# Patient Record
Sex: MALE | Race: WHITE | Employment: FULL TIME | URBAN - METROPOLITAN AREA
[De-identification: names, ages, dates, MRNs, and addresses within clinical notes are randomized per-mention and may not be internally consistent; named-entity substitution may affect disease eponyms.]

---

## 2017-01-26 ENCOUNTER — HOSPITAL ENCOUNTER (OUTPATIENT)
Dept: MRI IMAGING | Age: 37
Discharge: HOME | End: 2017-01-26
Attending: ORTHOPAEDIC SURGERY
Payer: COMMERCIAL

## 2017-01-26 DIAGNOSIS — S46.819A: ICD-10-CM

## 2017-01-26 PROCEDURE — 73221 MRI JOINT UPR EXTREM W/O DYE: CPT

## 2018-04-16 ENCOUNTER — OFFICE VISIT (OUTPATIENT)
Dept: SURGERY | Age: 38
End: 2018-04-16

## 2018-04-16 VITALS
HEIGHT: 72 IN | TEMPERATURE: 98.7 F | HEART RATE: 87 BPM | BODY MASS INDEX: 38.47 KG/M2 | RESPIRATION RATE: 18 BRPM | SYSTOLIC BLOOD PRESSURE: 138 MMHG | OXYGEN SATURATION: 97 % | DIASTOLIC BLOOD PRESSURE: 84 MMHG | WEIGHT: 284 LBS

## 2018-04-16 DIAGNOSIS — I10 ESSENTIAL HYPERTENSION: ICD-10-CM

## 2018-04-16 DIAGNOSIS — K21.9 GASTROESOPHAGEAL REFLUX DISEASE, ESOPHAGITIS PRESENCE NOT SPECIFIED: ICD-10-CM

## 2018-04-16 DIAGNOSIS — E66.01 SEVERE OBESITY (BMI 35.0-39.9): Primary | ICD-10-CM

## 2018-04-16 DIAGNOSIS — G47.33 OSA (OBSTRUCTIVE SLEEP APNEA): ICD-10-CM

## 2018-04-16 RX ORDER — IBUPROFEN 200 MG
800 TABLET ORAL
COMMUNITY
End: 2019-05-17

## 2018-04-16 NOTE — PROGRESS NOTES
1. Have you been to the ER, urgent care clinic since your last visit? Hospitalized since your last visit?  no    2. Have you seen or consulted any other health care providers outside of the [address] [place] [address] since your last visit? Include any pap smears or colon screening. No          Reina Fuelling  Body composition    male  42 y.o. Vitals:    04/16/18 1108   Height: 6' (1.829 m)     There is no height or weight on file to calculate BMI.   .Neck- 19.5 inches  Waist- 51.5 inches  Hips- 51 inches  Frame size-  medium

## 2018-04-16 NOTE — PROGRESS NOTES
HISTORY OF PRESENT ILLNESS  Claudeen Rocks is a 42 y.o. male. HPI   Chief Complaint   Patient presents with    New Patient     new bariatric patient     Claudeen Rocks is a 42 y.o. male that presents today for evaluation for weight loss surgery. Obesity \"getting worse and affecting my job\"; worse as teen after hip surgery. Weight now affecting job and if \"I don't get a handle on it I will lose my job\". Mom had gastric bypass many years ago.   He completed an on line seminar and would like to be considered for Sleeve gastrectomy for treatment of morbid obesity     Medi Weight loss - did well and lost about 25 lbs   Atkins, Weight watchers, low carb plans   Diet:  Skips breakfast, eats lunch and dinner; snacks in the evening (salty crunchy snacks); + sweets; hungry often  Drinks mostly Dr. Niyah Sharp or [facility], trying to do more water   Patient Active Problem List   Diagnosis Code    Severe obesity (BMI 35.0-39.9) ([facility] [address] [address] E66.01     Past Medical History:   Diagnosis Date    Arthritis     Depression     Hypertension     borderline/no lnger on any medications    Migraine     Morbid obesity ([facility] [address]     DIETER (obstructive sleep apnea)     mild     Other ill-defined conditions(799.89)     Kidney stones     Past Surgical History:   Procedure Laterality Date    HX APPENDECTOMY      HX CARPAL TUNNEL RELEASE Right     HX HEENT      Tonsilectomy    HX ORTHOPAEDIC      right hip surgery x4      Social History     Social History    Marital status:      Spouse name: N/A    Number of children: 3    Years of education: N/A     Occupational History    maintenance       Social History Main Topics    Smoking status: Former Smoker     Packs/day: 0.25     Quit date: 08/2013    Smokeless tobacco: Never Used      Comment: vaps     Alcohol use No    Drug use: No    Sexual activity: Yes     Partners: Female     Other Topics Concern    Not on file     Social History Narrative    In the home with spouse and 15year old (2 older children out of the house)      Family History   Problem Relation Age of Onset    Dementia Mother     Heart Disease Father     Dementia Father     Diabetes Father        Current Outpatient Prescriptions:     ibuprofen (MOTRIN) 200 mg tablet, Take 800 mg by mouth every eight (8) hours as needed for Pain., Disp: , Rfl:     butalbital-acetaminophen-caffeine (FIORICET, ESGIC) -40 mg per tablet, Take 2 Tabs by mouth every six (6) hours as needed for Headache. Max Daily Amount: 6 Tabs., Disp: 30 Tab, Rfl: 0    prochlorperazine (COMPAZINE) 5 mg tablet, 1-2 tabs po every 6 hours prn nausea or headache, Disp: 40 Tab, Rfl: 0  Allergies   Allergen Reactions    Hydrocodone Anxiety    Oxycodone Anxiety     PCP Zainab Pereira MD    Review of Systems   Constitutional: Positive for malaise/fatigue (\"zapped all day\"). HENT: Positive for sore throat (awakens with sore throat ). Eyes: Negative. Respiratory: Positive for shortness of breath. Negative for cough and wheezing (in the past ). Snores terrible and has had + sleep eval in the past but not enough apnea to qualify for CPAP \"30-40 lbs ago\"  Per spouse \"snores awful\"   Cardiovascular: Positive for chest pain (\"think it is heartburn\"). Negative for leg swelling. Gastrointestinal: Positive for diarrhea (post prandial and always loose ) and heartburn (weekly , no Rx ). Negative for abdominal pain, blood in stool, constipation, melena, nausea and vomiting. Genitourinary: Negative for dysuria, frequency, hematuria and urgency. Hx kidney stones   Last bout 2017; has had ureteral stent in the past    Musculoskeletal: Positive for back pain (low back ), joint pain (knee pain and swelling , right hip ) and myalgias. Negative for falls and neck pain. Skin:        Scaling on scalp and face    Neurological: Positive for headaches (migraines few times per year ). Negative for tingling and seizures.         Restless leg Endo/Heme/Allergies: Negative. Psychiatric/Behavioral: The patient does not have insomnia. Physical Exam   Constitutional: He is oriented to person, place, and time. No distress. /84  Pulse 87  Temp 98.7 °F (37.1 °C)  Resp 18  Ht 6' (1.829 m)  Wt 284 lb (128.8 kg)  SpO2 97%  BMI 38.52 kg/m2     Cardiovascular: Normal rate and regular rhythm. Pulmonary/Chest: Effort normal and breath sounds normal.   Abdominal: Soft. Obese     Musculoskeletal:   Ambulating independently    Neurological: He is alert and oriented to person, place, and time. Skin: He is not diaphoretic. Psychiatric: He has a normal mood and affect. ASSESSMENT and PLAN    ICD-10-CM ICD-9-CM    1. Severe obesity (BMI 35.0-39.9) (HCC) E66.01 278.01    2. DIETER (obstructive sleep apnea) G47.33 327.23    3. Essential hypertension I10 401.9    4. BMI 38.0-38.9,adult Z68.38 V85.38      David Hickey meets criteria established by the NIH. Without weight reduction, co-morbidities will escalate as well as risk of early mortality. Recommendation is patient could be served with surgical weight reduction, the procedure of Sleeve gastrectomy for treatment of morbid obesity . I explained to the patient differences between laparoscopic gastric bypass and sleeve gastrectomy procedures. Patient has viewed our informational meeting and has seen our educational materials. Patient desires to have surgery with Francisco Javier Jules MD.   Sleeve gastrectomy or vertical gastric resection involves a resection of the vast majority of the stomach usually done with a dilator pressed against the right half of the stomach of the lesser curvature. One preserves the pyloric sphincter at the lower end of the stomach and then sequentially staples and divides all the way up to the gastroesophageal junction leaving a small rim of the stomach to the left better known as the angle of His.  This procedure significantly reduces the amount that the stomach can hold while removing the part of the stomach that secretes the hormone grehlin and alters the speed of food emptying from the stomach. Once food leaves the stomach, the remainder of the intestinal tract is completely intact and there is no effect on the digestion or absorption of food nutrients and calories. The procedure is intermediate between the adjustable gastric band and the gastric bypass as far as weight loss, potential complications and resolution of comorbid diseases such as Type 2 diabetes, high blood pressure, sleep apnea, arthritic pain, cholesterol disorders to mention a few. The usual complications are that of any procedure which affects the ability of the stomach to accept food at any given time. Those are usually nausea and vomiting which either spontaneously resolve or require endoscopic dilatation. The most serious complication from this surgery is a leak from the staple line usually near the  gastroesophageal junction. The frequency of this serious complication is low and usually heals spontaneously although reoperation may be necessary. The other serious complications are those which can occur with any major surgery and include  Infection, bleeding, blood clots and/or cardiopulmonary problems. 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 I reviewed risks and complications associated with each procedure. I discussed a diet high in protein, low-fat, low- sugar, limited carbohydrates, and discontinuing use of carbonated beverages. Also discussed physical activity and exercises. I have answered all questions, they wish to proceed.   Judge Corrigan verbalized understanding and questions were answered to the best of my knowledge and ability. Bariatric surgery  educational materials were provided. 55 minutes spent in face to face with David Hickey > 50% counseling.     ** Copy to PCP Harman Macias MD

## 2018-04-24 PROBLEM — E66.01 SEVERE OBESITY (BMI 35.0-39.9): Status: ACTIVE | Noted: 2018-04-24

## 2018-06-19 ENCOUNTER — HOSPITAL ENCOUNTER (OUTPATIENT)
Dept: GENERAL RADIOLOGY | Age: 38
Discharge: HOME | End: 2018-06-19
Attending: NURSE PRACTITIONER
Payer: COMMERCIAL

## 2018-06-19 DIAGNOSIS — E66.01 SEVERE OBESITY (BMI 35.0-39.9): ICD-10-CM

## 2018-06-19 PROCEDURE — 74241 XR UPPER GI W KUB/ BA SWALLOW: CPT

## 2018-06-25 ENCOUNTER — CLINICAL SUPPORT (OUTPATIENT)
Dept: SURGERY | Age: 38
End: 2018-06-25

## 2018-06-25 DIAGNOSIS — E66.9 OBESITY (BMI 30-39.9): Primary | ICD-10-CM

## 2018-06-26 VITALS — WEIGHT: 289 LBS | BODY MASS INDEX: 39.2 KG/M2

## 2018-06-26 NOTE — PROGRESS NOTES
Pre-operative Bariatric Nutrition Evaluation    Date: 2018   Clover Rico M.D. Name: Alona Mullen  :  1980  Age:  42  Gender: Male   Type of Surgery: []           Gastric Bypass  []           LAGB  [x]           Sleeve Gastrectomy    ASSESSMENT:    Past Medical History:HTN, symptoms of sleep apnea, high cholesterol, arthritis, depression, anxiety      Medications/Supplements:   Prior to Admission medications    Medication Sig Start Date End Date Taking? Authorizing Provider   ibuprofen (MOTRIN) 200 mg tablet Take 800 mg by mouth every eight (8) hours as needed for Pain. Historical Provider   butalbital-acetaminophen-caffeine (FIORICET, ESGIC) -40 mg per tablet Take 2 Tabs by mouth every six (6) hours as needed for Headache. Max Daily Amount: 6 Tabs. 9/5/15   Chris Moreno MD   prochlorperazine (COMPAZINE) 5 mg tablet 1-2 tabs po every 6 hours prn nausea or headache 9/5/15   Chris Moreno MD       Food Allergies/Intolerances:none    Anthropometrics:    Ht:6'    Wt: 289#    IBW: 178#    %IBW: 162%     BMI:39    Category: obesity II     Reported wt history:Pt presents today for pre-op nutrition evaluation for wt loss surgery. Pt reports being \"skinny as a kid\" and wt gain began around age 10 after having 3 hip surgeries which resulted in decreased physical activity. Pt also reports a strong family h/o obesity. Lowest adult BW was 230# at age 25 and current wt is highest adult BW. Pt has been able to lose up to 25# through various diets but has been unable to achieve more long term or significant wt loss. Pt is now seeking approval for weight loss surgery.       Exercise/Physical Activity:recently started walking daily for 1 hour     Reported Diet History:Medi Weight Loss - lost 25# and \"was easy to follow\"'; Atkins, Weight Watchers, diet pills,     24 Hour Diet Recall  Breakfast  Cup of coffee; usually skips breakfast    Lunch  [place] ([facility]) sandwich, chips, nuts, cookie    Dinner  Steak, potato, veggies/salad    Snacks  Pretzels, popcorn, ice cream    Beverages  Water, sugar free tea      A pre-op nutrition checklist was reviewed. Patient checked off 5 of 15 items. Environment/Psychosocial/Support:pt's wife, children and in-laws are listed as main support system. Pt rates support level a 7 out of 10. Pt reports his wife is disabled and they have 3 children. Pt does majority of grocery shopping and cooking. Pt works full time. NUTRITION DIAGNOSIS:  1. Self-monitoring deficit r/t previous lack of value for this change evidenced by pt skips meals. 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 Also reports tendency to eat more when stressed and some emotional eating on occasion. 2. Food and nutrition related knowledge deficit r/t previous lack of exposure to information evidenced by pt seeking nutrition education in preparation for sleeve gastrectomy. NUTRITION INTERVENTION:  Pt educated on nutrition recommendations for weight loss surgery, specifically sleeve gastrectomy. Instructed on consuming 3 meals per day starting now. Use the balanced plate method to plan meals, include 3 oz of lean source of protein, 1/2 cup whole grains, unlimited non-starchy vegetables, 1/2 cup fruit and 1 serving of low fat dairy. Utilize handouts listing healthy snack and meal ideas to limit restaurant meals. After surgery measure all meals to 1/2 cup. Each meal will contain a 1/4 cup lean protein and 1/4 cup fruit, non-starchy vegetable or starch (limiting to once per day). Aim for 60 g protein per day. Sip on 48-64 oz of sugar free, calorie free, non-carbonated beverages each day. Do not use a straw. Do not consume beverages 30 minutes before, during or 30 minutes after meals. Read all nutrition labels. Demonstrated and emphasized identifying serving size, total fat, sugar and protein content.  Defined low fat as </= 3 g per serving. Discussed lean and extra lean sources of protein. Provided list of low fat cooking methods. Avoid foods with sugar listed in the first 3 ingredients and >/15 g sugar per serving. Excess sugar/fat intake may lead to dumping syndrome. Discussed signs and symptoms of dumping syndrome. Practice mindful eating habits; take small bites, chew thoroughly, avoid distractions, utilize hunger/fullness scale. Consume meals over 20-30 minutes. Attend Bariatric Support Group and increase physical activity (approved per MD) for long term weight maintenance. NUTRITION MONITORING AND EVALUATION:    The following goals were established with patient;  1. Work on eating 3 meals a day. Okay to use a protein shake as a meal. Do not skip meals after surgery to ensure adequate protein intake. 2. Monitor stress/emotional eating and find non-food coping strategies. 3. Maintain current fluid intake and beverage choices. 4. Use balanced plate method for help with meal planning and portion control before surgery. 5. Maintain current exercise/walking regimen. 6. Follow up with RD in 2 weeks to review general nutrition guidelines for sleeve gastrectomy. Specific tips and techniques to facilitate compliance with above recommendations were provided and discussed. Pt was strongly encourage to begin making necessary changes now, attend support group, and re-visit the dietitian prn. Nutrition evaluation reveals some small lifestyle/behavior changes are indicated. Goals set and recommendations made. Will follow up in 2 weeks to re-evaluate compliance with goals and further education. If further details are desired please feel free to contact me at [phone]. This phone number was also provided to the patient for any further questions or concerns.            Gil Shetty RD

## 2018-06-28 ENCOUNTER — HOSPITAL ENCOUNTER (OUTPATIENT)
Dept: SLEEP MEDICINE | Age: 38
Discharge: HOME | End: 2018-06-28
Payer: COMMERCIAL

## 2018-06-28 ENCOUNTER — OFFICE VISIT (OUTPATIENT)
Dept: SLEEP MEDICINE | Age: 38
End: 2018-06-28

## 2018-06-28 VITALS
HEIGHT: 72 IN | DIASTOLIC BLOOD PRESSURE: 78 MMHG | BODY MASS INDEX: 39.01 KG/M2 | HEART RATE: 94 BPM | WEIGHT: 288 LBS | OXYGEN SATURATION: 95 % | SYSTOLIC BLOOD PRESSURE: 113 MMHG

## 2018-06-28 DIAGNOSIS — G25.81 RESTLESS LEG SYNDROME: ICD-10-CM

## 2018-06-28 DIAGNOSIS — G47.33 OSA (OBSTRUCTIVE SLEEP APNEA): Primary | ICD-10-CM

## 2018-06-28 PROCEDURE — 95806 SLEEP STUDY UNATT&RESP EFFT: CPT | Performed by: SPECIALIST

## 2018-06-28 NOTE — PROGRESS NOTES
[address] [facility] [place], [address]  Tel.  [phone]  Fax. [address] [facility] [address]  [place], [address] [facility]  Tel.  [phone]  Fax. [phone] [address]  Tel.  [phone]  Fax. [phone]       Chief Complaint       Chief Complaint   Patient presents with    Sleep Problem     NP; ref Dr Davide Matt; for DIETER       HPI      Dave Jhaveri is a 42y.o. year old male referred by Dr. Davide Matt for evaluation of a sleep disorder  . He reports a many year history of difficulty with snoring described as loud. His wife has told him of associated apnea. He will awaken still feeling fatigued. He states that he is tired during the day more notably when in active such as reading or watching television. He continues to have some leg movements. He denies sleep paralysis, hypnagogic hallucinations, cataplexy. He has not had any appreciable change in weight. He states that he had an evaluation over 10 years ago potentially at [facility]. He was told that he did not have significant sleep disordered breathing but was noted to have frequent nocturnal leg movements. He was started on treatment which he discontinued due to frequent arm jerks; the leg movements did respond. The patient retires at 10 pm and awakens at 6:30 am. The patient notes that he will not experience frequent awakening from sleep. In general he is able to return to sleep after awakening. he tends to awaken spontaneously. [place] Sleepiness Score: 10       Allergies   Allergen Reactions    Hydrocodone Anxiety    Oxycodone Anxiety       Current Outpatient Prescriptions   Medication Sig Dispense Refill    ibuprofen (MOTRIN) 200 mg tablet Take 800 mg by mouth every eight (8) hours as needed for Pain.       butalbital-acetaminophen-caffeine (FIORICET, ESGIC) -40 mg per tablet Take 2 Tabs by mouth every six (6) hours as needed for Headache. Max Daily Amount: 6 Tabs. 30 Tab 0    prochlorperazine (COMPAZINE) 5 mg tablet 1-2 tabs po every 6 hours prn nausea or headache 40 Tab 0        He  has a past medical history of Arthritis; Depression; Hypertension; Migraine; Morbid obesity ([address] DIETER (obstructive sleep apnea); and Other ill-defined conditions(799.89). He  has a past surgical history that includes hx appendectomy; hx orthopaedic; hx heent; and hx carpal tunnel release (Right). He family history includes Dementia in his father and mother; Diabetes in his father; Heart Disease in his father. He  reports that he quit smoking about 4 years ago. He smoked 0.25 packs per day. He has never used smokeless tobacco. He reports that he does not drink alcohol or use illicit drugs. Review of Systems:  Review of Systems   HENT: Negative for hearing loss. Eyes: Negative for double vision. Respiratory: Negative for cough and shortness of breath. Cardiovascular: Negative for chest pain. Gastrointestinal: Negative for heartburn. Genitourinary: Negative for urgency. Musculoskeletal: Positive for back pain and joint pain. Skin: Negative for rash. Neurological: Negative for tremors and weakness. Endo/Heme/Allergies: Does not bruise/bleed easily. Psychiatric/Behavioral: Positive for depression. Objective:     Visit Vitals    /78    Pulse 94    Ht 6' (1.829 m)    Wt 288 lb (130.6 kg)    SpO2 95%    BMI 39.06 kg/m2     Body mass index is 39.06 kg/(m^2). General:   Conversant, cooperative   Eyes:  Pupils equal and reactive, no nystagmus, flat disk margins. Normal A/V ratio   Oropharynx:   Mallampati score III, tongue large,   Tonsils:   tonsils normal   Neck:   No carotid bruits; Neck circ.  in \"inches\": 19.25   Chest/Lungs:  Clear on auscultation    CVS:  Normal rate, regular rhythm, trace dorsalis pedis, no distal edema   Skin:  Warm to touch; no obvious rashes   Neuro:  Speech fluent, face symmetrical, tongue movement normal   Psych:  Normal affect,  normal countenance        Assessment:       ICD-10-CM ICD-9-CM    1. DIETER (obstructive sleep apnea) G47.33 327.23 SLEEP STUDY UNATTENDED, 4 CHANNEL   2. Restless leg syndrome G25.81 333.94      History of significant snoring, witnessed apnea, nonrestorative sleep and daytime fatigue consistent with sleep disordered breathing. He will be evaluated with a home sleep test.  He has been previously diagnosed with Restless leg syndrome; at present that appears to be less of a problem. Plan:     Orders Placed This Encounter    SLEEP STUDY UNATTENDED, 4 CHANNEL     Order Specific Question:   Reason for Exam     Answer:   Snoring, witnessed apnea, nonrestorative sleep, daytime fatigue       * Patient has a history and examination consistent with the diagnosis of sleep apnea. * Sleep testing was ordered for initial evaluation. * He was provided information on sleep apnea including corresponding risk factors and the importance of proper treatment. * Treatment options if indicated were reviewed today. Potential benefit of weight reduction was discussed. Weight reduction techniques reviewed.       Leslie Herrera MD, Ariadna Bolden  06/28/18

## 2018-06-28 NOTE — PATIENT INSTRUCTIONS
Sleep Apnea: Care Instructions  Your Care Instructions    Sleep apnea means that you frequently stop breathing for 10 seconds or longer during sleep. It can be mild to severe, based on the number of times an hour that you stop breathing or have slowed breathing. Blocked or narrowed airways in your nose, mouth, or throat can cause sleep apnea. Your airway can become blocked when your throat muscles and tongue relax during sleep. You can treat sleep apnea at home by making lifestyle changes. You also can use a CPAP breathing machine that keeps tissues in the throat from blocking your airway. Or your doctor may suggest that you use a breathing device while you sleep. It helps keep your airway open. This could be a device that you put in your mouth. Other examples include strips or disks that you use on your nose. In some cases, surgery may be needed to remove enlarged tissues in the throat. Follow-up care is a key part of your treatment and safety. Be sure to make and go to all appointments, and call your doctor if you are having problems. It's also a good idea to know your test results and keep a list of the medicines you take. How can you care for yourself at home? · Lose weight, if needed. It may reduce the number of times you stop breathing or have slowed breathing. · Sleep on your side. It may stop mild apnea. If you tend to roll onto your back, sew a pocket in the back of your pajama top. Put a tennis ball into the pocket, and stitch the pocket shut. This will help keep you from sleeping on your back. · Avoid alcohol and medicines such as sleeping pills and sedatives before bed. · Do not smoke. Smoking can make sleep apnea worse. If you need help quitting, talk to your doctor about stop-smoking programs and medicines. These can increase your chances of quitting for good. · Prop up the head of your bed 4 to 6 inches by putting bricks under the legs of the bed.   · Treat breathing problems, such as a stuffy nose, caused by a cold or allergies. · Try a continuous positive airway pressure (CPAP) breathing machine if your doctor recommends it. The machine keeps your airway open when you sleep. · If CPAP does not work for you, ask your doctor if you can try other breathing machines. A bilevel positive airway pressure machine uses one type of air pressure for breathing in and another type for breathing out. Another device raises or lowers air pressure as needed while you breathe. · Talk to your doctor if:  ¨ Your nose feels dry or bleeds when you use one of these machines. You may need to increase moisture in the air. A humidifier may help. ¨ Your nose is runny or stuffy from using a breathing machine. Decongestants or a corticosteroid nasal spray may help. ¨ You are sleepy during the day and it gets in the way of the normal things you do. Do not drive when you are drowsy. When should you call for help? Watch closely for changes in your health, and be sure to contact your doctor if:  ? · You still have sleep apnea even though you have made lifestyle changes. ? · You are thinking of trying a device such as CPAP. ? · You are having problems using a CPAP or similar machine. Where can you learn more? Go to http://ramon-damari.info/. Enter I758 in the search box to learn more about \"Sleep Apnea: Care Instructions. \"  Current as of: May 12, 2017  Content Version: 11.4  © 8283-9152 [employer]. Care instructions adapted under license by [employer] (which disclaims liability or warranty for this information). If you have questions about a medical condition or this instruction, always ask your healthcare professional. [address] any warranty or liability for your use of this information.

## 2018-06-29 ENCOUNTER — DOCUMENTATION ONLY (OUTPATIENT)
Dept: SLEEP MEDICINE | Age: 38
End: 2018-06-29

## 2018-07-02 ENCOUNTER — TELEPHONE (OUTPATIENT)
Dept: SLEEP MEDICINE | Age: 38
End: 2018-07-02

## 2018-07-02 NOTE — TELEPHONE ENCOUNTER
HSAT demonstrates mild elevation in AHI when supine. Prominent snoring is noted. The patient does have a history of nonrestorative sleep. He may benefit from an oral appliance. Chief technologist: Advise patient of study results.   Suggest  does referral for an oral appliance

## 2018-07-03 ENCOUNTER — DOCUMENTATION ONLY (OUTPATIENT)
Dept: SLEEP MEDICINE | Age: 38
End: 2018-07-03

## 2018-07-03 NOTE — PROGRESS NOTES
This note is being routed to Sandy Charles NP. Sleep Medicine consult note and sleep study report in patient's chart for review.     Thank you for the referral.

## 2018-07-13 ENCOUNTER — CLINICAL SUPPORT (OUTPATIENT)
Dept: SURGERY | Age: 38
End: 2018-07-13

## 2018-07-13 DIAGNOSIS — E66.9 OBESITY (BMI 30-39.9): Primary | ICD-10-CM

## 2018-07-16 VITALS — BODY MASS INDEX: 39.47 KG/M2 | WEIGHT: 291 LBS

## 2018-07-16 NOTE — PROGRESS NOTES
Pre-operative Bariatric Nutrition Evaluation - Follow Up     Date: 2018  Eduardo Gillespie M.D. Name: Love Hart  :  1980  Age:  42  Gender: Male   Type of Surgery: []           Gastric Bypass  []           LAGB  [x]           Sleeve Gastrectomy    ASSESSMENT:    Pt presents today for pre-op nutrition follow up in preparation for sleeve gastrectomy. Initial nutrition evaluation revealed some lifestyle and behavior changes were indicated to better prepare for surgery. Goals were set and recommendations were made. Today pt reports several changes made in the past 2 weeks including eating 3 meals a day (not skipping), slowing down his eating pace, using protein shakes, limiting grazing, more packing from home and intentional eating and no sodas/juice/Gatorades. He has gotten a promotion at work which allow more structure to his meal times/patterns. Despite changes pt's wt is up 2#. Reports he has recently finished school. Overall pt demonstrates motivation for lifestyle changes and good understanding of general nutrition guidelines for sleeve gastrectomy. Anthropometrics:    Ht:6'  Today's Wt: 291#  Wt at initial nutrition visit (18): 289#   Net change: 2# wt gain       BMI:39    Category: obesity II     Exercise/Physical Activity:maintains walking for 1 hour daily     A pre-op nutrition checklist was reviewed. Patient checked off 10 of 15 items. NUTRITION DIAGNOSIS:  No nutrition diagnosis at this time. NUTRITION INTERVENTION:  Pt educated on nutrition recommendations for weight loss surgery, specifically sleeve gastrectomy. Instructed on consuming 3 meals per day starting now. Use the balanced plate method to plan meals, include 3 oz of lean source of protein, 1/2 cup whole grains, unlimited non-starchy vegetables, 1/2 cup fruit and 1 serving of low fat dairy. Utilize handouts listing healthy snack and meal ideas to limit restaurant meals.       After surgery measure all meals to 1/2 cup. Each meal will contain a 1/4 cup lean protein and 1/4 cup fruit, non-starchy vegetable or starch (limiting to once per day). Aim for 60 g protein per day. Sip on 48-64 oz of sugar free, calorie free, non-carbonated beverages each day. Do not use a straw. Do not consume beverages 30 minutes before, during or 30 minutes after meals. Read all nutrition labels. Demonstrated and emphasized identifying serving size, total fat, sugar and protein content. Defined low fat as </= 3 g per serving. Discussed lean and extra lean sources of protein. Provided list of low fat cooking methods. Avoid foods with sugar listed in the first 3 ingredients and >/15 g sugar per serving. Excess sugar/fat intake may lead to dumping syndrome. Discussed signs and symptoms of dumping syndrome. Practice mindful eating habits; take small bites, chew thoroughly, avoid distractions, utilize hunger/fullness scale. Consume meals over 20-30 minutes. Attend Bariatric Support Group and increase physical activity (approved per MD) for long term weight maintenance. NUTRITION MONITORING AND EVALUATION:    The following goals were established with patient;  1. Maintain lifestyle and behavior changes made. 2. Maintain exercise. 3. Continue to focus on protein at each meal and practice eating protein first at the meal.  4. Maintain adequate fluid intake. 5. Follow up with RD PRN. Specific tips and techniques to facilitate compliance with above recommendations were provided and discussed. Pt was strongly encourage to begin making necessary changes now, attend support group, and re-visit the dietitian prn. Nutrition follow up reveals pt has made appropriate lifestyle and behavior changes and demonstrates good understanding of nutrition guidelines for sleeve gastrectomy. Appears to be an appropriate candidate for surgery at this time.   If further details are desired please feel free to contact me at [phone]. This phone number was also provided to the patient for any further questions or concerns.            Samara Burns RD

## 2018-08-27 ENCOUNTER — OFFICE VISIT (OUTPATIENT)
Dept: SURGERY | Age: 38
End: 2018-08-27

## 2018-08-27 VITALS
OXYGEN SATURATION: 94 % | SYSTOLIC BLOOD PRESSURE: 108 MMHG | TEMPERATURE: 98.5 F | HEART RATE: 82 BPM | WEIGHT: 284.8 LBS | RESPIRATION RATE: 20 BRPM | HEIGHT: 72 IN | BODY MASS INDEX: 38.57 KG/M2 | DIASTOLIC BLOOD PRESSURE: 78 MMHG

## 2018-08-27 DIAGNOSIS — E66.01 SEVERE OBESITY (BMI 35.0-39.9): Primary | ICD-10-CM

## 2018-08-27 DIAGNOSIS — G47.33 OSA (OBSTRUCTIVE SLEEP APNEA): ICD-10-CM

## 2018-08-27 DIAGNOSIS — I10 ESSENTIAL HYPERTENSION: ICD-10-CM

## 2018-08-27 NOTE — MR AVS SNAPSHOT
[address] [id number] [phone] Patient: Vandana Ontiveros MRN: [id number] QRK:[id number] Visit Information Date & Time Provider Department Dept. Phone Encounter #  
 8/27/2018  2:40 PM Ivan Eastman MD [address] [address] [phone] [id number] Upcoming Health Maintenance Date Due DTaP/Tdap/Td series (1 - Tdap) 7/25/1999 Influenza Age 5 to Adult 8/1/2018 Allergies as of 8/27/2018  Review Complete On: 8/27/2018 By: Ivan Eastman MD  
  
 Severity Noted Reaction Type Reactions Hydrocodone  06/25/2014    Anxiety Oxycodone  05/11/2013   Intolerance Anxiety Current Immunizations  Never Reviewed No immunizations on file. Not reviewed this visit You Were Diagnosed With   
  
 Codes Comments Severe obesity (BMI 35.0-39.9) (HCC)    -  Primary ICD-10-CM: E66.01 
ICD-9-CM: 278.01   
 DIETER (obstructive sleep apnea)     ICD-10-CM: G47.33 
ICD-9-CM: 327.23 Essential hypertension     ICD-10-CM: I10 
ICD-9-CM: 401.9 Vitals BP Pulse Temp Resp Height(growth percentile) Weight(growth percentile) 108/78 (BP 1 Location: Left arm, BP Patient Position: Sitting) 82 98.5 °F (36.9 °C) (Oral) 20 6' (1.829 m) 284 lb 12.8 oz (129.2 kg) SpO2 BMI Smoking Status 94% 38.63 kg/m2 Former Smoker Vitals History BMI and BSA Data Body Mass Index Body Surface Area  
 38.63 kg/m 2 2.56 m 2 Preferred Pharmacy Pharmacy Name Phone [id number] [phone] Your Updated Medication List  
  
   
This list is accurate as of 8/27/18  5:16 PM.  Always use your most recent med list.  
  
  
  
  
 butalbital-acetaminophen-caffeine -40 mg per tablet Commonly known as:  [place] Take 2 Tabs by mouth every six (6) hours as needed for Headache. Max Daily Amount: 6 Tabs. ibuprofen 200 mg tablet Commonly known as:  MOTRIN Take 800 mg by mouth every eight (8) hours as needed for Pain.  
  
 prochlorperazine 5 mg tablet Commonly known as:  COMPAZINE  
1-2 tabs po every 6 hours prn nausea or headache Patient Instructions Learning About Bariatric Surgery What is bariatric surgery? Bariatric surgery is surgery to help you lose weight. This type of surgery is only used for people who are very overweight and have not been able to lose weight with diet and exercise. This surgery makes the stomach smaller. Some types of surgery also change the connection between your stomach and intestines. How is bariatric surgery done? Bariatric surgery may be either \"open\" or \"laparoscopic. \" Open surgery is done through a large cut (incision) in the belly. Laparoscopic surgery is done through several small cuts. The doctor puts a lighted tube, or scope, and other surgical tools through small cuts in your belly. The doctor is able to see your organs with the scope. There are different types of bariatric surgery. Gastric sleeve surgery The surgery is usually done through several small incisions in the belly. The doctor removes more than half of your stomach. This leaves a thin sleeve, or tube, that is about the size of a banana. Because part of your stomach has been removed, this can't be reversed. Jessy-en-Y gastric bypass surgery Jessy-en-Y (say \"mukul-en-why\") surgery changes the connection between the stomach and the intestines. The doctor separates a section of your stomach from the rest of your stomach. This makes a small pouch. The new pouch will hold the food you eat. The doctor connects the stomach pouch to the middle part of the small intestine. Gastric banding surgery The surgery is usually done through several small incisions in the belly. The doctor wraps a band around the upper part of the stomach.  This creates a small pouch. The small size of the pouch means that you will get full after you eat just a small amount of food. The doctor can inflate or deflate the band to adjust the size. This lets the doctor adjust how quickly food passes from the new pouch into the stomach. It does not change the connection between the stomach and the intestines. What can you expect after the surgery? You may stay in the hospital for one or more days after the surgery. How long you stay depends on the type of surgery you had. Most people need 2 to 4 weeks before they are ready to get back to their usual routine. For the first 2 to 6 weeks after surgery, you probably will need to follow a liquid or soft diet. Bit by bit, you will be able to eat more solid foods. Your doctor may advise you to work with a dietitian. This way you'll be sure to get enough protein, vitamins, and minerals while you are losing weight. Even with a healthy diet, you may need to take vitamin and mineral supplements. After surgery, you will not be able to eat very much at one time. You will get full quickly. Try not to eat too much at one time or eat foods that are high in fat or sugar. If you do, you may vomit, get stomach pain, or have diarrhea. You probably will lose weight very quickly in the first few months after surgery. As time goes on, your weight loss will slow down. You will have regular doctor visits to check how you are doing. Think of bariatric surgery as a tool to help you lose weight. It isn't an instant fix. You will still need to eat a healthy diet and get regular exercise. This will help you reach your weight goal and avoid regaining the weight you lose. Follow-up care is a key part of your treatment and safety. Be sure to make and go to all appointments, and call your doctor if you are having problems. It's also a good idea to know your test results and keep a list of the medicines you take. Where can you learn more? Go to http://ramon-damari.info/. Enter G469 in the search box to learn more about \"Learning About Bariatric Surgery. \" Current as of: October 9, 2017 Content Version: 11.7 © 3486-1867 [employer]. Care instructions adapted under license by [employer] (which disclaims liability or warranty for this information). If you have questions about a medical condition or this instruction, always ask your healthcare professional. [address] any warranty or liability for your use of this information. Introducing [facility]! Jeana Welsh introduces Semanticator patient portal. Now you can access parts of your medical record, email your doctor's office, and request medication refills online. 1. In your internet browser, go to [email or web address] 2. Click on the First Time User? Click Here link in the Sign In box. You will see the New Member Sign Up page. 3. Enter your Semanticator Access Code exactly as it appears below. You will not need to use this code after youve completed the sign-up process. If you do not sign up before the expiration date, you must request a new code. · Semanticator Access Code: [email or web address] Expires: 10/26/2018  5:19 AM 
 
4. Enter the last four digits of your Social Security Number (xxxx) and Date of Birth (mm/dd/yyyy) as indicated and click Submit. You will be taken to the next sign-up page. 5. Create a Semanticator ID. This will be your Semanticator login ID and cannot be changed, so think of one that is secure and easy to remember. 6. Create a Semanticator password. You can change your password at any time. 7. Enter your Password Reset Question and Answer. This can be used at a later time if you forget your password. 8. Enter your e-mail address. You will receive e-mail notification when new information is available in [address] 9. Click Sign Up. You can now view and download portions of your medical record. 10. Click the Download Summary menu link to download a portable copy of your medical information. If you have questions, please visit the Frequently Asked Questions section of the [facility] website. Remember, [facility] is NOT to be used for urgent needs. For medical emergencies, dial 911. Now available from your iPhone and Android! Please provide this summary of care documentation to your next provider. Your primary care clinician is listed as Adrian العلي. If you have any questions after today's visit, please call [phone].

## 2018-08-27 NOTE — PROGRESS NOTES
1. Have you been to the ER, urgent care clinic since your last visit? Hospitalized since your last visit? No    2. Have you seen or consulted any other health care providers outside of the [address] [place] [address] since your last visit? Include any pap smears or colon screening.  No

## 2018-08-27 NOTE — PROGRESS NOTES
Bariatric Surgery Consult    Magui Gunderson is a 34 y.o. male with a history of morbid obesity. His Height: 6' (182.9 cm), Weight: 284 lb 12.8 oz (129.2 kg). Body mass index is 38.63 kg/(m^2). He reports that he has been trying to lose weight for 10 years. His maximum weight was 284 pounds. He has attended our bariatric surgery information seminar. Mikaela Durand wants to consider laparoscopic sleeve gastrectomy. Pt is referred by:  Katelin Alba MD.      Comorbidities:     Bariatric comorbidities present: hypertension and obstructive sleep apnea    Ambulatory status: independent    The patient's reported level of exercise: moderately active. Patient Active Problem List    Diagnosis Date Noted    Severe obesity (BMI 35.0-39.9) ([address] 04/24/2018     Past Medical History:   Diagnosis Date    Arthritis     Depression     Hypertension     borderline/no lnger on any medications    Migraine     Morbid obesity ([address]     DIETER (obstructive sleep apnea)     mild     Other ill-defined conditions(799.89)     Kidney stones      Past Surgical History:   Procedure Laterality Date    HX APPENDECTOMY      HX CARPAL TUNNEL RELEASE Right     HX HEENT      Tonsilectomy    HX ORTHOPAEDIC      right hip surgery x4       Social History   Substance Use Topics    Smoking status: Former Smoker     Packs/day: 0.25     Quit date: 08/2013    Smokeless tobacco: Never Used      Comment: vaps     Alcohol use No      Family History   Problem Relation Age of Onset    Dementia Mother     Heart Disease Father     Dementia Father     Diabetes Father       . Current Outpatient Prescriptions   Medication Sig    butalbital-acetaminophen-caffeine (FIORICET, ESGIC) -40 mg per tablet Take 2 Tabs by mouth every six (6) hours as needed for Headache. Max Daily Amount: 6 Tabs.  ibuprofen (MOTRIN) 200 mg tablet Take 800 mg by mouth every eight (8) hours as needed for Pain.     prochlorperazine (COMPAZINE) 5 mg tablet 1-2 tabs po every 6 hours prn nausea or headache     No current facility-administered medications for this visit. Allergies   Allergen Reactions    Hydrocodone Anxiety    Oxycodone Anxiety         Review of Systems:    Constitutional: negative  Ears, Nose, Mouth, Throat, and Face: negative  Respiratory: negative  Cardiovascular: negative  Gastrointestinal: negative  Genitourinary:negative  Integument/Breast: negative  Hematologic/Lymphatic: negative  Musculoskeletal:negative  Neurological: negative  Behavioral/Psychiatric: negative    Objective:     Visit Vitals    /78 (BP 1 Location: Left arm, BP Patient Position: Sitting)    Pulse 82    Temp 98.5 °F (36.9 °C) (Oral)    Resp 20    Ht 6' (1.829 m)    Wt 284 lb 12.8 oz (129.2 kg)    SpO2 94%    BMI 38.63 kg/m2        Physical Exam:    General:  alert, no distress, morbidly obese   Eyes:  conjunctivae and sclerae normal, pupils equal, round, reactive to light, extraocular movements intact without nystagmus   Throat & Neck: no erythema or exudates noted and neck supple and symmetrical; no palpable masses   Lungs:   clear to auscultation bilaterally   Heart:  Regular rate and rhythm   Abdomen:   obese, soft, nontender, nondistended, no masses or organomegaly,    Extremities: no edema,  no gait disturbances   Skin: Normal.       Assessment:     1. Morbid obesity (Body mass index is 38.63 kg/(m^2). ) with multiple comorbidities. The patient meets criteria established by the NIH for weight loss surgery candidates. Without weight reduction, co-morbidities will escalate as well as increase risk of early mortality. Our recommendation is the patient could be served with laparoscopic sleeve gastrectomy. I explained to the patient differences between laparoscopic gastric bypass, laparoscopic adjustable gastric banding, and laparoscopic vertical sleeve gastrectomy with respect to expected weight loss, resolution of comorbidities and risks.  Mr. Josie Ayala has attended one our informational meetings and has seen our educational materials. He has requested Dr. Twan Conrad to perform his procedure. I reviewed the role for this procedure as a tool to help him achieve his weight loss goals. I reminded him that effective weight loss comes from lifelong adherence to changes in dietary choices, eating habits and exercise. Recommendation: We will request approval for laparoscopic sleeve gastrectomy. He is a good candidate for sleeve gastrectomy. He has minimal to no GERD. Signed By: Sarah Grissom MD     August 27, 2018       Greater than half of the time: 30 minutes was used in counciling the patient about bariatric surgery and the steps she needs to take to move forward with her surgery. Mr. Mayela Arguelles has a reminder for a \"due or due soon\" health maintenance. I have asked that he contact his primary care provider for follow-up on this health maintenance.

## 2018-08-27 NOTE — LETTER
8/27/2018 5:15 PM 
 
Patient:  Love Hart YOB: 1980 Date of Visit: 8/27/2018 Dear Ronald Lujan MD 
[address] [place] [address] VIA Facsimile: [phone] 
 : Thank you for referring Mr. Luke Carr to me for evaluation/treatment. Below are the relevant portions of my assessment and plan of care. If you have questions, please do not hesitate to call me. I look forward to following Mr. Lj Schulz along with you. Sincerely, Dean Kim MD

## 2018-08-27 NOTE — PATIENT INSTRUCTIONS
Learning About Bariatric Surgery  What is bariatric surgery? Bariatric surgery is surgery to help you lose weight. This type of surgery is only used for people who are very overweight and have not been able to lose weight with diet and exercise. This surgery makes the stomach smaller. Some types of surgery also change the connection between your stomach and intestines. How is bariatric surgery done? Bariatric surgery may be either \"open\" or \"laparoscopic. \" Open surgery is done through a large cut (incision) in the belly. Laparoscopic surgery is done through several small cuts. The doctor puts a lighted tube, or scope, and other surgical tools through small cuts in your belly. The doctor is able to see your organs with the scope. There are different types of bariatric surgery. Gastric sleeve surgery  The surgery is usually done through several small incisions in the belly. The doctor removes more than half of your stomach. This leaves a thin sleeve, or tube, that is about the size of a banana. Because part of your stomach has been removed, this can't be reversed. Jessy-en-Y gastric bypass surgery  Jessy-en-Y (say \"mukul-en-why\") surgery changes the connection between the stomach and the intestines. The doctor separates a section of your stomach from the rest of your stomach. This makes a small pouch. The new pouch will hold the food you eat. The doctor connects the stomach pouch to the middle part of the small intestine. Gastric banding surgery  The surgery is usually done through several small incisions in the belly. The doctor wraps a band around the upper part of the stomach. This creates a small pouch. The small size of the pouch means that you will get full after you eat just a small amount of food. The doctor can inflate or deflate the band to adjust the size. This lets the doctor adjust how quickly food passes from the new pouch into the stomach.  It does not change the connection between the stomach and the intestines. What can you expect after the surgery? You may stay in the hospital for one or more days after the surgery. How long you stay depends on the type of surgery you had. Most people need 2 to 4 weeks before they are ready to get back to their usual routine. For the first 2 to 6 weeks after surgery, you probably will need to follow a liquid or soft diet. Bit by bit, you will be able to eat more solid foods. Your doctor may advise you to work with a dietitian. This way you'll be sure to get enough protein, vitamins, and minerals while you are losing weight. Even with a healthy diet, you may need to take vitamin and mineral supplements. After surgery, you will not be able to eat very much at one time. You will get full quickly. Try not to eat too much at one time or eat foods that are high in fat or sugar. If you do, you may vomit, get stomach pain, or have diarrhea. You probably will lose weight very quickly in the first few months after surgery. As time goes on, your weight loss will slow down. You will have regular doctor visits to check how you are doing. Think of bariatric surgery as a tool to help you lose weight. It isn't an instant fix. You will still need to eat a healthy diet and get regular exercise. This will help you reach your weight goal and avoid regaining the weight you lose. Follow-up care is a key part of your treatment and safety. Be sure to make and go to all appointments, and call your doctor if you are having problems. It's also a good idea to know your test results and keep a list of the medicines you take. Where can you learn more? Go to http://ramon-damari.info/. Enter G469 in the search box to learn more about \"Learning About Bariatric Surgery. \"  Current as of: October 9, 2017  Content Version: 11.7  © 8336-3258 [employer].  Care instructions adapted under license by [employer] (which disclaims liability or warranty for this information). If you have questions about a medical condition or this instruction, always ask your healthcare professional. [place] [address] any warranty or liability for your use of this information.

## 2019-02-25 ENCOUNTER — OFFICE VISIT (OUTPATIENT)
Dept: SURGERY | Age: 39
End: 2019-02-25

## 2019-02-25 VITALS
DIASTOLIC BLOOD PRESSURE: 83 MMHG | HEART RATE: 93 BPM | RESPIRATION RATE: 20 BRPM | SYSTOLIC BLOOD PRESSURE: 123 MMHG | OXYGEN SATURATION: 95 % | HEIGHT: 72 IN | TEMPERATURE: 98.5 F | WEIGHT: 285 LBS | BODY MASS INDEX: 38.6 KG/M2

## 2019-02-25 DIAGNOSIS — G47.33 OSA (OBSTRUCTIVE SLEEP APNEA): ICD-10-CM

## 2019-02-25 DIAGNOSIS — I10 ESSENTIAL HYPERTENSION: ICD-10-CM

## 2019-02-25 DIAGNOSIS — E66.01 SEVERE OBESITY (BMI 35.0-35.9 WITH COMORBIDITY) (HCC): Primary | ICD-10-CM

## 2019-02-25 NOTE — LETTER
2/25/2019 4:32 PM 
 
Patient:  Elyse Choudhary YOB: 1980 Date of Visit: 2/25/2019 Dear Heydi Rivas MD 
[address] [place] [address] VIA Facsimile: [phone] 
 : Thank you for referring Mr. Calixto Jeter to me for evaluation/treatment. Below are the relevant portions of my assessment and plan of care. If you have questions, please do not hesitate to call me. I look forward to following Mr. Jenniffer Rust along with you. Sincerely, Haylee Gabriel MD

## 2019-02-25 NOTE — PROGRESS NOTES
Bariatric Surgery Consult Lopez White is a 34 y.o. male with a history of morbid obesity. His Height: 6' (182.9 cm), Weight: 285 lb (129.3 kg). Body mass index is 38.65 kg/m². He reports that he has been trying to lose weight for 10 years. His maximum weight was 285 pounds. He has attended our bariatric surgery information seminar. Tatyana Martinez wants to consider laparoscopic sleeve gastrectomy. He had been previously approved for surgery but could go through with surgery due to a family medical emergency. He is now ready to proceed with surgery approval 
 
Pt is referred by:  Ron Sesay MD. Comorbidities:  
 
Bariatric comorbidities present: hypertension and obstructive sleep apnea Ambulatory status: independent The patient's reported level of exercise: moderately active. Patient Active Problem List  
 Diagnosis Date Noted  Severe obesity (BMI 35.0-39.9) 2018 Past Medical History:  
Diagnosis Date  Arthritis  Depression  Hypertension   
 borderline/no lnger on any medications  Migraine  Morbid obesity ([address]  DIETER (obstructive sleep apnea)   
 mild  Other ill-defined conditions(799.89) Kidney stones Past Surgical History:  
Procedure Laterality Date  HX APPENDECTOMY  HX CARPAL TUNNEL RELEASE Right  HX HEENT Tonsilectomy  HX ORTHOPAEDIC    
 right hip surgery x4 Social History Tobacco Use  Smoking status: Former Smoker Packs/day: 0.25 Last attempt to quit: 2013 Years since quittin.5  Smokeless tobacco: Never Used  Tobacco comment: vaps Substance Use Topics  Alcohol use: No  
  
Family History Problem Relation Age of Onset  Dementia Mother  Heart Disease Father  Dementia Father  Diabetes Father Lisa Peaks Current Outpatient Medications Medication Sig  ibuprofen (MOTRIN) 200 mg tablet Take 800 mg by mouth every eight (8) hours as needed for Pain.  butalbital-acetaminophen-caffeine (FIORICET, ESGIC) -40 mg per tablet Take 2 Tabs by mouth every six (6) hours as needed for Headache. Max Daily Amount: 6 Tabs.  prochlorperazine (COMPAZINE) 5 mg tablet 1-2 tabs po every 6 hours prn nausea or headache No current facility-administered medications for this visit. Allergies Allergen Reactions  Hydrocodone Anxiety  Oxycodone Anxiety Review of Systems:   
Constitutional: negative Ears, Nose, Mouth, Throat, and Face: negative Respiratory: negative Cardiovascular: negative Gastrointestinal: negative Genitourinary:negative Integument/Breast: negative Hematologic/Lymphatic: negative Musculoskeletal:negative Neurological: negative Objective:  
 
Visit Vitals /83 (BP 1 Location: Left arm, BP Patient Position: Sitting) Pulse 93 Temp 98.5 °F (36.9 °C) (Oral) Resp 20 Ht 6' (1.829 m) Wt 285 lb (129.3 kg) SpO2 95% BMI 38.65 kg/m² Physical Exam: 
 
General:  alert, no distress, morbidly obese Eyes:  conjunctivae and sclerae normal, pupils equal, round, reactive to light, extraocular movements intact without nystagmus Throat & Neck: no erythema or exudates noted and neck supple and symmetrical; no palpable masses Lungs:   clear to auscultation bilaterally Heart:  Regular rate and rhythm Abdomen:   obese, soft, nontender, nondistended, no masses or organomegaly, Extremities: no edema,  no gait disturbances Skin: Normal.  
 
 
Assessment:  
 
1. Morbid obesity (Body mass index is 38.65 kg/m².) with multiple comorbidities. The patient meets criteria established by the NIH for weight loss surgery candidates. Without weight reduction, co-morbidities will escalate as well as increase risk of early mortality. Our recommendation is the patient could be served with laparoscopic sleeve gastrectomy.  I explained to the patient differences between laparoscopic gastric bypass, laparoscopic adjustable gastric banding, and laparoscopic vertical sleeve gastrectomy with respect to expected weight loss, resolution of comorbidities and risks. Mr. Amalia Solo has attended one our informational meetings and has seen our educational materials. He has requested Dr. Laurel Mercado to perform his procedure. I reviewed the role for this procedure as a tool to help him achieve his weight loss goals. I reminded him that effective weight loss comes from lifelong adherence to changes in dietary choices, eating habits and exercise. Recommendation: We will request approval for laparoscopic sleeve gastrectomy. He is a good candidate for sleeve gastrectomy and will be resubmitted for approval.   
 
Signed By: Boubacar Lr MD   
 February 25, 2019 Greater than half of the time: 30 minutes was used in counciling the patient about bariatric surgery and the steps she needs to take to move forward with her surgery. Mr. Amalia Solo has a reminder for a \"due or due soon\" health maintenance. I have asked that he contact his primary care provider for follow-up on this health maintenance.

## 2019-02-25 NOTE — PROGRESS NOTES
1. Have you been to the ER, urgent care clinic since your last visit? Hospitalized since your last visit? No 
 
2. Have you seen or consulted any other health care providers outside of the [address] [place] [address] since your last visit? Include any pap smears or colon screening.  No

## 2019-04-22 ENCOUNTER — HOSPITAL ENCOUNTER (OUTPATIENT)
Dept: PREADMISSION TESTING | Age: 39
Discharge: HOME | End: 2019-04-22
Payer: COMMERCIAL

## 2019-04-22 ENCOUNTER — OFFICE VISIT (OUTPATIENT)
Dept: SURGERY | Age: 39
End: 2019-04-22

## 2019-04-22 ENCOUNTER — HOSPITAL ENCOUNTER (OUTPATIENT)
Dept: GENERAL RADIOLOGY | Age: 39
Discharge: HOME | End: 2019-04-22
Attending: SURGERY
Payer: COMMERCIAL

## 2019-04-22 VITALS
RESPIRATION RATE: 18 BRPM | OXYGEN SATURATION: 99 % | BODY MASS INDEX: 40.94 KG/M2 | HEART RATE: 72 BPM | DIASTOLIC BLOOD PRESSURE: 82 MMHG | TEMPERATURE: 98.1 F | SYSTOLIC BLOOD PRESSURE: 125 MMHG | HEIGHT: 70 IN | WEIGHT: 286 LBS

## 2019-04-22 VITALS
BODY MASS INDEX: 40.94 KG/M2 | HEIGHT: 70 IN | SYSTOLIC BLOOD PRESSURE: 125 MMHG | WEIGHT: 286 LBS | RESPIRATION RATE: 18 BRPM | DIASTOLIC BLOOD PRESSURE: 82 MMHG | TEMPERATURE: 98.1 F | OXYGEN SATURATION: 99 % | HEART RATE: 72 BPM

## 2019-04-22 VITALS
DIASTOLIC BLOOD PRESSURE: 82 MMHG | WEIGHT: 286 LBS | HEIGHT: 70 IN | TEMPERATURE: 98.1 F | OXYGEN SATURATION: 99 % | HEART RATE: 72 BPM | SYSTOLIC BLOOD PRESSURE: 125 MMHG | BODY MASS INDEX: 40.94 KG/M2

## 2019-04-22 DIAGNOSIS — E66.01 SEVERE OBESITY (BMI 35.0-35.9 WITH COMORBIDITY) (HCC): Primary | ICD-10-CM

## 2019-04-22 DIAGNOSIS — E66.01 MORBID OBESITY WITH BMI OF 40.0-44.9, ADULT (HCC): Primary | ICD-10-CM

## 2019-04-22 DIAGNOSIS — G47.33 OSA (OBSTRUCTIVE SLEEP APNEA): ICD-10-CM

## 2019-04-22 DIAGNOSIS — G43.101 MIGRAINE WITH AURA AND WITH STATUS MIGRAINOSUS, NOT INTRACTABLE: ICD-10-CM

## 2019-04-22 DIAGNOSIS — K21.9 GASTROESOPHAGEAL REFLUX DISEASE, ESOPHAGITIS PRESENCE NOT SPECIFIED: ICD-10-CM

## 2019-04-22 LAB
ALBUMIN SERPL-MCNC: 3.7 G/DL (ref 3.5–5)
ALBUMIN/GLOB SERPL: 1.1 {RATIO} (ref 1.1–2.2)
ALP SERPL-CCNC: 78 U/L (ref 45–117)
ALT SERPL-CCNC: 23 U/L (ref 12–78)
ANION GAP SERPL CALC-SCNC: 4 MMOL/L (ref 5–15)
APPEARANCE UR: CLEAR
AST SERPL-CCNC: 13 U/L (ref 15–37)
ATRIAL RATE: 66 BPM
BACTERIA URNS QL MICRO: NEGATIVE /HPF
BASOPHILS # BLD: 0.1 K/UL (ref 0–0.1)
BASOPHILS NFR BLD: 1 % (ref 0–1)
BILIRUB SERPL-MCNC: 0.5 MG/DL (ref 0.2–1)
BILIRUB UR QL: NEGATIVE
BUN SERPL-MCNC: 13 MG/DL (ref 6–20)
BUN/CREAT SERPL: 17 (ref 12–20)
CALCIUM SERPL-MCNC: 9.1 MG/DL (ref 8.5–10.1)
CALCULATED P AXIS, ECG09: 32 DEGREES
CALCULATED R AXIS, ECG10: 14 DEGREES
CALCULATED T AXIS, ECG11: 52 DEGREES
CHLORIDE SERPL-SCNC: 108 MMOL/L (ref 97–108)
CO2 SERPL-SCNC: 28 MMOL/L (ref 21–32)
COLOR UR: NORMAL
CREAT SERPL-MCNC: 0.78 MG/DL (ref 0.7–1.3)
DIAGNOSIS, 93000: NORMAL
DIFFERENTIAL METHOD BLD: NORMAL
EOSINOPHIL # BLD: 0.3 K/UL (ref 0–0.4)
EOSINOPHIL NFR BLD: 4 % (ref 0–7)
EPITH CASTS URNS QL MICRO: NORMAL /LPF
ERYTHROCYTE [DISTWIDTH] IN BLOOD BY AUTOMATED COUNT: 12.4 % (ref 11.5–14.5)
GLOBULIN SER CALC-MCNC: 3.4 G/DL (ref 2–4)
GLUCOSE SERPL-MCNC: 78 MG/DL (ref 65–100)
GLUCOSE UR STRIP.AUTO-MCNC: NEGATIVE MG/DL
HCT VFR BLD AUTO: 45.5 % (ref 36.6–50.3)
HGB BLD-MCNC: 14.4 G/DL (ref 12.1–17)
HGB UR QL STRIP: NEGATIVE
HYALINE CASTS URNS QL MICRO: NORMAL /LPF (ref 0–5)
IMM GRANULOCYTES # BLD AUTO: 0 K/UL (ref 0–0.04)
IMM GRANULOCYTES NFR BLD AUTO: 0 % (ref 0–0.5)
KETONES UR QL STRIP.AUTO: NEGATIVE MG/DL
LEUKOCYTE ESTERASE UR QL STRIP.AUTO: NEGATIVE
LYMPHOCYTES # BLD: 2.8 K/UL (ref 0.8–3.5)
LYMPHOCYTES NFR BLD: 29 % (ref 12–49)
MCH RBC QN AUTO: 29.3 PG (ref 26–34)
MCHC RBC AUTO-ENTMCNC: 31.6 G/DL (ref 30–36.5)
MCV RBC AUTO: 92.7 FL (ref 80–99)
MONOCYTES # BLD: 1 K/UL (ref 0–1)
MONOCYTES NFR BLD: 11 % (ref 5–13)
NEUTS SEG # BLD: 5.3 K/UL (ref 1.8–8)
NEUTS SEG NFR BLD: 55 % (ref 32–75)
NITRITE UR QL STRIP.AUTO: NEGATIVE
NRBC # BLD: 0 K/UL (ref 0–0.01)
NRBC BLD-RTO: 0 PER 100 WBC
P-R INTERVAL, ECG05: 150 MS
PH UR STRIP: 7 [PH] (ref 5–8)
PLATELET # BLD AUTO: 318 K/UL (ref 150–400)
PMV BLD AUTO: 10.2 FL (ref 8.9–12.9)
POTASSIUM SERPL-SCNC: 4.3 MMOL/L (ref 3.5–5.1)
PROT SERPL-MCNC: 7.1 G/DL (ref 6.4–8.2)
PROT UR STRIP-MCNC: NEGATIVE MG/DL
Q-T INTERVAL, ECG07: 378 MS
QRS DURATION, ECG06: 90 MS
QTC CALCULATION (BEZET), ECG08: 396 MS
RBC # BLD AUTO: 4.91 M/UL (ref 4.1–5.7)
RBC #/AREA URNS HPF: NORMAL /HPF (ref 0–5)
SODIUM SERPL-SCNC: 140 MMOL/L (ref 136–145)
SP GR UR REFRACTOMETRY: 1.02 (ref 1–1.03)
UA: UC IF INDICATED,UAUC: NORMAL
UROBILINOGEN UR QL STRIP.AUTO: 1 EU/DL (ref 0.2–1)
VENTRICULAR RATE, ECG03: 66 BPM
WBC # BLD AUTO: 9.5 K/UL (ref 4.1–11.1)
WBC URNS QL MICRO: NORMAL /HPF (ref 0–4)

## 2019-04-22 PROCEDURE — 81001 URINALYSIS AUTO W/SCOPE: CPT

## 2019-04-22 PROCEDURE — 80053 COMPREHEN METABOLIC PANEL: CPT

## 2019-04-22 PROCEDURE — 93005 ELECTROCARDIOGRAM TRACING: CPT

## 2019-04-22 PROCEDURE — 85025 COMPLETE CBC W/AUTO DIFF WBC: CPT

## 2019-04-22 PROCEDURE — 71046 X-RAY EXAM CHEST 2 VIEWS: CPT

## 2019-04-22 RX ORDER — OMEPRAZOLE 20 MG/1
20 CAPSULE, DELAYED RELEASE ORAL DAILY
Qty: 30 CAP | Refills: 1 | Status: SHIPPED | OUTPATIENT
Start: 2019-04-22 | End: 2019-06-18 | Stop reason: SDUPTHER

## 2019-04-22 RX ORDER — ONDANSETRON 4 MG/1
4 TABLET, ORALLY DISINTEGRATING ORAL
Qty: 20 TAB | Refills: 0 | Status: SHIPPED | OUTPATIENT
Start: 2019-04-22 | End: 2019-06-09 | Stop reason: SDUPTHER

## 2019-04-22 RX ORDER — GABAPENTIN 100 MG/1
100-200 CAPSULE ORAL
Qty: 30 CAP | Refills: 0 | Status: SHIPPED | OUTPATIENT
Start: 2019-04-22 | End: 2019-04-27

## 2019-04-22 RX ORDER — HYOSCYAMINE SULFATE 0.12 MG/1
0.12 TABLET SUBLINGUAL
Qty: 30 TAB | Refills: 0 | Status: SHIPPED | OUTPATIENT
Start: 2019-04-22 | End: 2019-06-18 | Stop reason: SDUPTHER

## 2019-04-22 RX ORDER — AMOXICILLIN 250 MG
1 CAPSULE ORAL
Qty: 30 TAB | Refills: 1 | Status: SHIPPED | OUTPATIENT
Start: 2019-04-22 | End: 2019-06-18 | Stop reason: SDUPTHER

## 2019-04-22 NOTE — PATIENT INSTRUCTIONS
Make your 2, 4 and 6 week appts for after surgery      your after surgery prescriptions BEFORE surgery     Start the pre op diet and vitamins       For pain after surgery:  - tylenol extra strength 2 tabs every 8 hours   - gabapentin 100-200 mg every 8 hours as needed   - heat and abdominal support also helpful     The day before surgery:  - take 2 Extra strength Tylenol at noon and 8 pm   - you may drink sugar free clear liquids up until 3 hours prior to surgery     Sign up for My Chart              Learning About How to Prepare for Weight-Loss Surgery  How can you prepare for weight-loss surgery? Having weight-loss surgery (also called bariatric surgery) is a big step. You can prepare for surgery by having a plan. Your plan may include your goals for losing weight and how to makes changes in your diet, activity, and lifestyle to help raise your chances of success. One way to prepare for surgery is to think about your goal or reason why you want to reach a healthy weight. Do you want to lower your blood pressure, cholesterol, or blood sugar? Do you want to be able to sleep better, play with your kids, or walk around the block? Having a reason can help you stay with your plan and meet your goals. Your weight-loss surgery team can help you meet your goals and get ready for surgery. Henrietta Schmidt work with a team that's trained to help you lose weight and make healthy changes in your life. This team may include:  · A medical doctor or nurse to help manage your care and schedule tests before surgery. · A surgeon who specializes in weight-loss surgery. · A registered dietitian to help you plan meals and make changes in the way you eat. · An exercise specialist to help you be more active and get stronger. · A therapist or counselor to help you learn why you eat and teach you ways to deal with stress and your emotions. Your team will also be there to help you prepare for life after surgery.  They will help you adjust to new ways of eating and changes to your body. How will weight-loss surgery affect your life? You have likely thought a lot about how surgery may affect your life--how you will eat, how your body will look, or how you will feel. Some people feel overwhelmed with these changes. But planning can help you prepare for the changes and meet your weight-loss goals. One important step in your plan is to learn about the ways surgery will affect your life. These may include:  · A slimmer you. You probably will lose weight very quickly in the first few months after surgery. As time goes on, your weight loss will slow down. How much weight you lose depends on what type of surgery you had and how well your new eating and activity plans are working for you. · A new way of eating. Success in reaching and keeping a healthy weight depends on making lifelong changes in how you eat. After surgery, you raise your chances of success if you:  ? Eat just a few ounces of food at a time. ? Eat very slowly and chew your food to mush. ? Don't drink for 30 minutes before you eat, during your meal, and for 30 minutes after you eat. ? Are careful about drinking alcohol. ? Avoid foods that are high in fat or sugar. ? Take vitamin and mineral supplements. · A healthier you. Weight-loss surgery can have some real health benefits. Problems like diabetes, high blood pressure, and sleep apnea may go away--or at least become easier to manage. · A more active you. After surgery, being active on most days of the week will help you reach your weight goal and avoid gaining back the weight you lose. · A lot of extra skin. When you lose weight quickly, you may have a lot of extra skin. That's normal. You can have surgery to remove the extra skin if it bothers you. There are going to be some ups and downs while you get used to these changes. So another way to adjust is to identify who can help support you.  Getting support from friends and family can help. And joining a support group for people who have had the surgery can be a big help too, because they know what you're going through. As you know, it's a big decision to have weight-loss surgery. But when you have a plan, you can focus on losing weight and living a healthier life. So what steps can you take to prepare for weight-loss surgery? Will you set some goals? Will you learn about how surgery can affect your life? How about asking family or friends for help? Write out your plan. Then get ready. Where can you learn more? Go to http://ramon-damari.info/. Enter U388 in the search box to learn more about \"Learning About How to Prepare for Weight-Loss Surgery. \"  Current as of: June 25, 2018  Content Version: 11.9  © 7081-1354 [employer]. Care instructions adapted under license by [employer] (which disclaims liability or warranty for this information). If you have questions about a medical condition or this instruction, always ask your healthcare professional. [address] any warranty or liability for your use of this information.

## 2019-04-22 NOTE — PERIOP NOTES
ERAS and PAT instructions reviewed with patient and given the opportunity to ask questions. Patient given surgical site information FAQs handout and reviewed. Patient given CHG wipes and instruction sheet, instructions for use reviewed with patient.

## 2019-04-22 NOTE — PROGRESS NOTES
1. Have you been to the ER, urgent care clinic since your last visit? Hospitalized since your last visit? No    2. Have you seen or consulted any other health care providers outside of the [address] [place] [address] since your last visit? Include any pap smears or colon screening.  No

## 2019-04-22 NOTE — PROGRESS NOTES
1. Have you been to the ER, urgent care clinic since your last visit? Hospitalized since your last visit? No    2. Have you seen or consulted any other health care providers outside of the [address] [place] [address] since your last visit? Include any pap smears or colon screening.  No

## 2019-04-22 NOTE — PROGRESS NOTES
HISTORY OF PRESENT ILLNESS  Love Hart is a 34 y.o. male. HPI   Chief Complaint   Patient presents with    Surg H&P     scheduled for Lap Sleeve Gastrectomy on 19 with Dr Sendy Patino     Patient Active Problem List   Diagnosis Code    Severe obesity (BMI 35.0-39. 9) E66.01     Past Medical History:   Diagnosis Date    Arthritis     Chronic pain     RIGHT HIP    Depression     GERD (gastroesophageal reflux disease)     Hypertension     borderline/no lnger on any medications (19)    Migraine     Morbid obesity ([facility]     DIETER (obstructive sleep apnea)     mild     Other ill-defined conditions(799.89)     Kidney stones      Past Surgical History:   Procedure Laterality Date    HX APPENDECTOMY      HX CARPAL TUNNEL RELEASE Right     HX HEENT      Tonsilectomy    HX HEENT      MARY.  LASIK    HX ORTHOPAEDIC      right hip surgery x4     HX ORTHOPAEDIC Right     CARPAL TUNNEL     Social History     Socioeconomic History    Marital status:      Spouse name: Not on file    Number of children: 3    Years of education: Not on file    Highest education level: Not on file   Occupational History    Occupation: superviser      Employer: [employer]   Social Needs    Financial resource strain: Not on file    Food insecurity:     Worry: Not on file     Inability: Not on file    Transportation needs:     Medical: Not on file     Non-medical: Not on file   Tobacco Use    Smoking status: Former Smoker     Packs/day: 0.25     Years: 20.00     Pack years: 5.00     Last attempt to quit: 2013     Years since quittin.7    Smokeless tobacco: Never Used    Tobacco comment: vaps    Substance and Sexual Activity    Alcohol use: No    Drug use: No    Sexual activity: Yes     Partners: Female   Lifestyle    Physical activity:     Days per week: Not on file     Minutes per session: Not on file    Stress: Not on file   Relationships    Social connections:     Talks on phone: Not on file     Gets together: Not on file     Attends [employer] service: Not on file     Active member of club or organization: Not on file     Attends meetings of clubs or organizations: Not on file     Relationship status: Not on file    Intimate partner violence:     Fear of current or ex partner: Not on file     Emotionally abused: Not on file     Physically abused: Not on file     Forced sexual activity: Not on file   Other Topics Concern    Not on file   Social History Narrative    In the home with spouse and 15year old (2 older children out of the house)      Family History   Problem Relation Age of Onset    Heart Disease Father     Diabetes Father     Arthritis-osteo Brother     Dementia Maternal Grandmother     Arthritis-osteo Brother     Anesth Problems Neg Hx          Review of Systems   HENT: Positive for congestion. Negative for tinnitus. Eyes: Negative. Respiratory:        Mild DIETER and no CPAP      Cardiovascular: Negative. No DVT history    Gastrointestinal: Positive for heartburn (pepcid AC helps ). Negative for abdominal pain, blood in stool, constipation, diarrhea, melena, nausea and vomiting. Genitourinary:        Hx kidney stones   Last year      Musculoskeletal: Positive for back pain (low back ) and joint pain (right hip ). Negative for falls, myalgias and neck pain. Skin:        Dry skin   Scalp and knees   Neurological: Positive for headaches (migraine few times year ). Negative for tingling and seizures. Endo/Heme/Allergies: Negative. Physical Exam   Constitutional: He is oriented to person, place, and time. No distress. /82   Pulse 72   Temp 98.1 °F (36.7 °C)   Resp 18   Ht 5' 10\" (1.778 m)   Wt 286 lb (129.7 kg)   SpO2 99%   BMI 41.04 kg/m²   White male    HENT:   Head: Normocephalic. Mouth/Throat: No oropharyngeal exudate. Eyes: Pupils are equal, round, and reactive to light. No scleral icterus. Neck: Normal range of motion. Neck supple. No JVD present. No tracheal deviation present. No thyromegaly present. Cardiovascular: Normal rate, regular rhythm and normal heart sounds. Pulmonary/Chest: Effort normal and breath sounds normal.   Abdominal: Soft. Bowel sounds are normal. He exhibits no distension. There is no tenderness. Musculoskeletal: Normal range of motion. He exhibits no edema. Lymphadenopathy:     He has no cervical adenopathy. Neurological: He is alert and oriented to person, place, and time. Skin: Skin is warm and dry. He is not diaphoretic. Psychiatric: He has a normal mood and affect. ASSESSMENT and PLAN    ICD-10-CM ICD-9-CM    1. Morbid obesity with BMI of 40.0-44.9, adult ([facility] E66.01 278.01     Z68.41 V85.41    2. DIETER (obstructive sleep apnea) G47.33 327.23    3. Gastroesophageal reflux disease, esophagitis presence not specified K21.9 530.81    4. Migraine with aura and with status migrainosus, not intractable G43.101 346.03      1. Morbid obesity:  Scheduled for Sleeve gastrectomy for treatment of morbid obesity  on 5/16/19 with Dr. Tariq Aguilera protocol reviewed:  Acetaminophen 1000 mg at noon and 8 pm day before surgery and may have clear liquids (no caffeine, no ETOH, no carbonation and calorie free) until 2 hours prior to surgery   Preadmission testing results reviewed face to face with patient   Post operative prescriptions sent to pharmacy on file for AFTER surgery:  Omeprazole 20 mg every day x 30 days, then reassess need; Zofran 4 mg ODT #20 no refills for post op nausea; Levsin 0.125 mcg SL / po q 4 hours prn chest pressure spasm associated with oral intake post op #30 no refills;    Post op pain management:  Gabapentin 100-300 mg q 8 hours prn pain x 5 days; acetaminophen 1000 mg po q 8 hours prn; abdominal support / splinting; heating pad prn   Started on liver shrinking diet and Bariatric vitamins   Reviewed post operative diet, restrictions, follow up and medications  Post operative 2, 4 and 6 week appointments made  Reviewed educational materials and book    2. DIETER monitor during perioperative period. He is NOT on CPAP and oral appliance was recommended which he never got   3. GERD PPI post op x 30 days and then reassess   4. Migraine with aura - he can use his regular cocktail to treat the migraines (they are rare)     Fang Ceballos verbalized understanding and questions were answered to the best of my knowledge and ability. surgery educational materials were provided.     36 minutes spent in face to face with patient

## 2019-04-22 NOTE — LETTER
4/22/19 Patient: Daphne Whitfield YOB: 1980 Date of Visit: 4/22/2019 Sandy Be MD 
[address] [place] [address] VIA Facsimile: [phone] Dear Sandy Be MD, Thank you for referring Mr. Igor Lebron to [facility] [address] [facility] for evaluation. My notes for this consultation are attached. If you have questions, please do not hesitate to call me. I look forward to following your patient along with you. Sincerely, Bibi Bone MD

## 2019-04-23 NOTE — PROGRESS NOTES
[employer] General Surgery History and Physical    History of Present Illness:      Royce Uribe is a 34 y.o. male who has been approved for laparoscopic sleeve gastrectomy. He has been doing well and no new medical issues. Past Medical History:   Diagnosis Date    Arthritis     Chronic pain     RIGHT HIP    Depression     GERD (gastroesophageal reflux disease)     Hypertension     borderline/no lnger on any medications (4-22-19)    Migraine     Morbid obesity (HCC)     DIETER (obstructive sleep apnea)     mild     Other ill-defined conditions(799.89)     Kidney stones 2018       Past Surgical History:   Procedure Laterality Date    HX APPENDECTOMY      HX CARPAL TUNNEL RELEASE Right     HX HEENT      Tonsilectomy    HX HEENT      MARY. LASIK    HX ORTHOPAEDIC      right hip surgery x4     HX ORTHOPAEDIC Right     CARPAL TUNNEL         Current Outpatient Medications:     ibuprofen (MOTRIN) 200 mg tablet, Take 800 mg by mouth every eight (8) hours as needed for Pain., Disp: , Rfl:     butalbital-acetaminophen-caffeine (FIORICET, ESGIC) -40 mg per tablet, Take 2 Tabs by mouth every six (6) hours as needed for Headache. Max Daily Amount: 6 Tabs., Disp: 30 Tab, Rfl: 0    prochlorperazine (COMPAZINE) 5 mg tablet, 1-2 tabs po every 6 hours prn nausea or headache, Disp: 40 Tab, Rfl: 0    omeprazole (PRILOSEC) 20 mg capsule, Take 1 Cap by mouth daily. AFTER SURGERY, Disp: 30 Cap, Rfl: 1    hyoscyamine SL (LEVSIN/SL) 0.125 mg SL tablet, 1 Tab by SubLINGual route every four (4) hours as needed for Cramping (CHEST PRESSURE AND PAIN AFTER SURGERY). , Disp: 30 Tab, Rfl: 0    ondansetron (ZOFRAN ODT) 4 mg disintegrating tablet, Take 1 Tab by mouth every eight (8) hours as needed for Nausea (AFTER SURGERY). , Disp: 20 Tab, Rfl: 0    gabapentin (NEURONTIN) 100 mg capsule, Take 1-2 Caps by mouth every eight (8) hours as needed (PAIN AFTER SURGERY) for up to 5 days. , Disp: 30 Cap, Rfl: 0   senna-docusate (PERICOLACE) 8.6-50 mg per tablet, Take 1 Tab by mouth daily as needed for Constipation.  Indications: constipation, Disp: 30 Tab, Rfl: 1    Allergies   Allergen Reactions    Hydrocodone Anxiety    Oxycodone Anxiety       Social History     Socioeconomic History    Marital status:      Spouse name: Not on file    Number of children: 3    Years of education: Not on file    Highest education level: Not on file   Occupational History    Occupation: superviser      Employer: [employer]   Social Needs    Financial resource strain: Not on file    Food insecurity:     Worry: Not on file     Inability: Not on file    Transportation needs:     Medical: Not on file     Non-medical: Not on file   Tobacco Use    Smoking status: Former Smoker     Packs/day: 0.25     Years: 20.00     Pack years: 5.00     Last attempt to quit: 2013     Years since quittin.7    Smokeless tobacco: Never Used    Tobacco comment: vaps    Substance and Sexual Activity    Alcohol use: No    Drug use: No    Sexual activity: Yes     Partners: Female   Lifestyle    Physical activity:     Days per week: Not on file     Minutes per session: Not on file    Stress: Not on file   Relationships    Social connections:     Talks on phone: Not on file     Gets together: Not on file     Attends [employer] service: Not on file     Active member of club or organization: Not on file     Attends meetings of clubs or organizations: Not on file     Relationship status: Not on file    Intimate partner violence:     Fear of current or ex partner: Not on file     Emotionally abused: Not on file     Physically abused: Not on file     Forced sexual activity: Not on file   Other Topics Concern    Not on file   Social History Narrative    In the home with spouse and 15year old (2 older children out of the house)        Family History   Problem Relation Age of Onset    Heart Disease Father     Diabetes Father    Edward Arthritis-osteo Brother     Dementia Maternal Grandmother     Arthritis-osteo Brother     Anesth Problems Neg Hx        ROS   Constitutional: negative  Ears, Nose, Mouth, Throat, and Face: negative  Respiratory: negative  Cardiovascular: negative  Gastrointestinal: negative  Genitourinary:negative  Integument/Breast: negative  Hematologic/Lymphatic: negative  Behavioral/Psychiatric: negative  Allergic/Immunologic: negative      Physical Exam:     Visit Vitals  /82 (BP 1 Location: Left arm, BP Patient Position: Sitting)   Pulse 72   Temp 98.1 °F (36.7 °C) (Oral)   Resp 18   Ht 5' 10\" (1.778 m)   Wt 286 lb (129.7 kg)   SpO2 99%   BMI 41.04 kg/m²       General - alert and oriented, no apparent distress  HEENT - no jaundice, no hearing imparement  Pulm - CTAB, no C/W/R  CV - RRR, no M/R/G  Abd - soft, ND, BS present, NTTP, no hernia  Ext - pulses intact in UE and LE bilaterally, no edema  Skin - supple, no rashes  Psychiatric - normal affect, good mood    Labs  Lab Results   Component Value Date/Time    Sodium 140 04/22/2019 11:19 AM    Potassium 4.3 04/22/2019 11:19 AM    Chloride 108 04/22/2019 11:19 AM    CO2 28 04/22/2019 11:19 AM    Anion gap 4 (L) 04/22/2019 11:19 AM    Glucose 78 04/22/2019 11:19 AM    BUN 13 04/22/2019 11:19 AM    Creatinine 0.78 04/22/2019 11:19 AM    BUN/Creatinine ratio 17 04/22/2019 11:19 AM    GFR est AA >60 04/22/2019 11:19 AM    GFR est non-AA >60 04/22/2019 11:19 AM    Calcium 9.1 04/22/2019 11:19 AM    Bilirubin, total 0.5 04/22/2019 11:19 AM    AST (SGOT) 13 (L) 04/22/2019 11:19 AM    Alk.  phosphatase 78 04/22/2019 11:19 AM    Protein, total 7.1 04/22/2019 11:19 AM    Albumin 3.7 04/22/2019 11:19 AM    Globulin 3.4 04/22/2019 11:19 AM    A-G Ratio 1.1 04/22/2019 11:19 AM    ALT (SGPT) 23 04/22/2019 11:19 AM     Lab Results   Component Value Date/Time    WBC 9.5 04/22/2019 11:19 AM    Hemoglobin (POC) 13.3 05/11/2013 04:28 PM    HGB 14.4 04/22/2019 11:19 AM    Hematocrit (POC) 39 05/11/2013 04:28 PM    HCT 45.5 04/22/2019 11:19 AM    PLATELET 138 72/33/3086 11:19 AM    MCV 92.7 04/22/2019 11:19 AM         Imaging  CXR - normal    UGI - normal    I have reviewed and agree with all of the pertinent images    Assessment:     Mani Stephens is a 34 y.o. male with morbid obesity    Recommendations:     1. He is ready for laparoscopic sleeve gastrectomy. I have discussed the above procedure with the patient in detail. We reviewed the benefits and possible complications of the surgery which include bleeding, infection, damage to adjacent organs, venous thromboembolism, need for repeat surgery, death and other unforseen complications. The patient agreed to proceed with the surgery. Lalo Ortiz MD    Mr. Alex Ahumada has a reminder for a \"due or due soon\" health maintenance. I have asked that he contact his primary care provider for follow-up on this health maintenance.

## 2019-04-23 NOTE — H&P (VIEW-ONLY)
[employer] [facility] Surgery History and Physical 
 
History of Present Illness:  
  
Terrie Singh is a 34 y.o. male who has been approved for laparoscopic sleeve gastrectomy. He has been doing well and no new medical issues. Past Medical History:  
Diagnosis Date  Arthritis  Chronic pain RIGHT HIP  Depression  GERD (gastroesophageal reflux disease)  Hypertension   
 borderline/no lnger on any medications (4-22-19)  Migraine  Morbid obesity ([address]  DIETER (obstructive sleep apnea)   
 mild  Other ill-defined conditions(799.89) Kidney stones 2018 Past Surgical History:  
Procedure Laterality Date  HX APPENDECTOMY  HX CARPAL TUNNEL RELEASE Right  HX HEENT Tonsilectomy  HX HEENT    
 MARY. LASIK  
 HX ORTHOPAEDIC    
 right hip surgery x4  HX ORTHOPAEDIC Right CARPAL TUNNEL Current Outpatient Medications:  
  ibuprofen (MOTRIN) 200 mg tablet, Take 800 mg by mouth every eight (8) hours as needed for Pain., Disp: , Rfl:  
  butalbital-acetaminophen-caffeine (FIORICET, ESGIC) -40 mg per tablet, Take 2 Tabs by mouth every six (6) hours as needed for Headache. Max Daily Amount: 6 Tabs., Disp: 30 Tab, Rfl: 0 
  prochlorperazine (COMPAZINE) 5 mg tablet, 1-2 tabs po every 6 hours prn nausea or headache, Disp: 40 Tab, Rfl: 0 
  omeprazole (PRILOSEC) 20 mg capsule, Take 1 Cap by mouth daily. AFTER SURGERY, Disp: 30 Cap, Rfl: 1 
  hyoscyamine SL (LEVSIN/SL) 0.125 mg SL tablet, 1 Tab by SubLINGual route every four (4) hours as needed for Cramping (CHEST PRESSURE AND PAIN AFTER SURGERY). , Disp: 30 Tab, Rfl: 0 
  ondansetron (ZOFRAN ODT) 4 mg disintegrating tablet, Take 1 Tab by mouth every eight (8) hours as needed for Nausea (AFTER SURGERY). , Disp: 20 Tab, Rfl: 0 
  gabapentin (NEURONTIN) 100 mg capsule, Take 1-2 Caps by mouth every eight (8) hours as needed (PAIN AFTER SURGERY) for up to 5 days. , Disp: 30 Cap, Rfl: 0 
   senna-docusate (PERICOLACE) 8.6-50 mg per tablet, Take 1 Tab by mouth daily as needed for Constipation. Indications: constipation, Disp: 30 Tab, Rfl: 1 Allergies Allergen Reactions  Hydrocodone Anxiety  Oxycodone Anxiety Social History Socioeconomic History  Marital status:  Spouse name: Not on file  Number of children: 3  
 Years of education: Not on file  Highest education level: Not on file Occupational History  Occupation: superviser Employer: [employer] Social Needs  Financial resource strain: Not on file  Food insecurity:  
  Worry: Not on file Inability: Not on file  Transportation needs:  
  Medical: Not on file Non-medical: Not on file Tobacco Use  Smoking status: Former Smoker Packs/day: 0.25 Years: 20.00 Pack years: 5.00 Last attempt to quit: 2013 Years since quittin.7  Smokeless tobacco: Never Used  Tobacco comment: vaps Substance and Sexual Activity  Alcohol use: No  
 Drug use: No  
 Sexual activity: Yes  
  Partners: Female Lifestyle  Physical activity:  
  Days per week: Not on file Minutes per session: Not on file  Stress: Not on file Relationships  Social connections:  
  Talks on phone: Not on file Gets together: Not on file Attends Jainism service: Not on file Active member of club or organization: Not on file Attends meetings of clubs or organizations: Not on file Relationship status: Not on file  Intimate partner violence:  
  Fear of current or ex partner: Not on file Emotionally abused: Not on file Physically abused: Not on file Forced sexual activity: Not on file Other Topics Concern  Not on file Social History Narrative In the home with spouse and 15year old (2 older children out of the house) Family History Problem Relation Age of Onset  Heart Disease Father  Diabetes Father  Arthritis-osteo Brother  Dementia Maternal Grandmother  Arthritis-osteo Brother  Anesth Problems Neg Hx   
 
 
ROS Constitutional: negative Ears, Nose, Mouth, Throat, and Face: negative Respiratory: negative Cardiovascular: negative Gastrointestinal: negative Genitourinary:negative Integument/Breast: negative Hematologic/Lymphatic: negative Behavioral/Psychiatric: negative Allergic/Immunologic: negative Physical Exam:  
 
Visit Vitals /82 (BP 1 Location: Left arm, BP Patient Position: Sitting) Pulse 72 Temp 98.1 °F (36.7 °C) (Oral) Resp 18 Ht 5' 10\" (1.778 m) Wt 286 lb (129.7 kg) SpO2 99% BMI 41.04 kg/m² General - alert and oriented, no apparent distress HEENT - no jaundice, no hearing imparement Pulm - CTAB, no C/W/R 
CV - RRR, no M/R/G Abd - soft, ND, BS present, NTTP, no hernia Ext - pulses intact in UE and LE bilaterally, no edema Skin - supple, no rashes Psychiatric - normal affect, good mood Labs Lab Results Component Value Date/Time Sodium 140 04/22/2019 11:19 AM  
 Potassium 4.3 04/22/2019 11:19 AM  
 Chloride 108 04/22/2019 11:19 AM  
 CO2 28 04/22/2019 11:19 AM  
 Anion gap 4 (L) 04/22/2019 11:19 AM  
 Glucose 78 04/22/2019 11:19 AM  
 BUN 13 04/22/2019 11:19 AM  
 Creatinine 0.78 04/22/2019 11:19 AM  
 BUN/Creatinine ratio 17 04/22/2019 11:19 AM  
 GFR est AA >60 04/22/2019 11:19 AM  
 GFR est non-AA >60 04/22/2019 11:19 AM  
 Calcium 9.1 04/22/2019 11:19 AM  
 Bilirubin, total 0.5 04/22/2019 11:19 AM  
 AST (SGOT) 13 (L) 04/22/2019 11:19 AM  
 Alk. phosphatase 78 04/22/2019 11:19 AM  
 Protein, total 7.1 04/22/2019 11:19 AM  
 Albumin 3.7 04/22/2019 11:19 AM  
 Globulin 3.4 04/22/2019 11:19 AM  
 A-G Ratio 1.1 04/22/2019 11:19 AM  
 ALT (SGPT) 23 04/22/2019 11:19 AM  
 
Lab Results Component Value Date/Time  WBC 9.5 04/22/2019 11:19 AM  
 Hemoglobin (POC) 13.3 05/11/2013 04:28 PM  
 HGB 14.4 04/22/2019 11:19 AM  
 Hematocrit (POC) 39 05/11/2013 04:28 PM  
 HCT 45.5 04/22/2019 11:19 AM  
 PLATELET 866 57/78/1292 11:19 AM  
 MCV 92.7 04/22/2019 11:19 AM  
 
 
 
Imaging CXR - normal 
 
UGI - normal 
 
I have reviewed and agree with all of the pertinent images Assessment:  
 
Alba Silva is a 34 y.o. male with morbid obesity Recommendations: 1. He is ready for laparoscopic sleeve gastrectomy. I have discussed the above procedure with the patient in detail. We reviewed the benefits and possible complications of the surgery which include bleeding, infection, damage to adjacent organs, venous thromboembolism, need for repeat surgery, death and other unforseen complications. The patient agreed to proceed with the surgery. Drake Howard MD 
 
Mr. Andi Gamez has a reminder for a \"due or due soon\" health maintenance. I have asked that he contact his primary care provider for follow-up on this health maintenance.

## 2019-05-16 ENCOUNTER — ANESTHESIA (OUTPATIENT)
Dept: SURGERY | Age: 39
DRG: 621 | End: 2019-05-16
Payer: COMMERCIAL

## 2019-05-16 ENCOUNTER — ANESTHESIA EVENT (OUTPATIENT)
Dept: SURGERY | Age: 39
DRG: 621 | End: 2019-05-16
Payer: COMMERCIAL

## 2019-05-16 ENCOUNTER — HOSPITAL ENCOUNTER (INPATIENT)
Age: 39
LOS: 1 days | Discharge: HOME | DRG: 621 | End: 2019-05-17
Attending: SURGERY | Admitting: SURGERY
Payer: COMMERCIAL

## 2019-05-16 DIAGNOSIS — E66.01 MORBID OBESITY WITH BMI OF 40.0-44.9, ADULT (HCC): Primary | ICD-10-CM

## 2019-05-16 PROCEDURE — 77030020263 HC SOL INJ SOD CL0.9% LFCR 1000ML: Performed by: SURGERY

## 2019-05-16 PROCEDURE — 76010000879 HC OR TIME 3.5 TO 4HR INTENSV - TIER 2: Performed by: SURGERY

## 2019-05-16 PROCEDURE — 77030040260 HC STPLR RELD SUREFORM DVNCI INTU -C: Performed by: SURGERY

## 2019-05-16 PROCEDURE — 74011250637 HC RX REV CODE- 250/637: Performed by: SURGERY

## 2019-05-16 PROCEDURE — 77030011640 HC PAD GRND REM COVD -A: Performed by: SURGERY

## 2019-05-16 PROCEDURE — 74011000250 HC RX REV CODE- 250

## 2019-05-16 PROCEDURE — 77030035044 HC TRCR ENDOSC VRSPRT BLDLSS COVD -C: Performed by: SURGERY

## 2019-05-16 PROCEDURE — 74011250636 HC RX REV CODE- 250/636: Performed by: SURGERY

## 2019-05-16 PROCEDURE — 77030035030 HC NDL INSUF VERES 150ML DISP COVD -B: Performed by: SURGERY

## 2019-05-16 PROCEDURE — 76210000006 HC OR PH I REC 0.5 TO 1 HR: Performed by: SURGERY

## 2019-05-16 PROCEDURE — 74011250636 HC RX REV CODE- 250/636

## 2019-05-16 PROCEDURE — 77030002996 HC SUT SLK J&J -A: Performed by: SURGERY

## 2019-05-16 PROCEDURE — 77030002895 HC DEV VASC CLOSR COVD -B: Performed by: SURGERY

## 2019-05-16 PROCEDURE — 77030008756 HC TU IRR SUC STRY -B: Performed by: SURGERY

## 2019-05-16 PROCEDURE — 77030032490 HC SLV COMPR SCD KNE COVD -B: Performed by: SURGERY

## 2019-05-16 PROCEDURE — 77030008684 HC TU ET CUF COVD -B: Performed by: NURSE ANESTHETIST, CERTIFIED REGISTERED

## 2019-05-16 PROCEDURE — 77030038157 HC DEV PWR CNTR DISP SIGNIA COVD -C: Performed by: SURGERY

## 2019-05-16 PROCEDURE — 77030035042 HC TRCR ENDOSC OPTCL BLDLSS COVD -D: Performed by: SURGERY

## 2019-05-16 PROCEDURE — 77030022704 HC SUT VLOC COVD -B: Performed by: SURGERY

## 2019-05-16 PROCEDURE — P9045 ALBUMIN (HUMAN), 5%, 250 ML: HCPCS

## 2019-05-16 PROCEDURE — 77030002933 HC SUT MCRYL J&J -A: Performed by: SURGERY

## 2019-05-16 PROCEDURE — 77030016151 HC PROTCTR LNS DFOG COVD -B: Performed by: SURGERY

## 2019-05-16 PROCEDURE — 77030029640 HC SEAL VSL ENDOWR ONE INTU -F: Performed by: SURGERY

## 2019-05-16 PROCEDURE — 77030008771 HC TU NG SALEM SUMP -A: Performed by: NURSE ANESTHETIST, CERTIFIED REGISTERED

## 2019-05-16 PROCEDURE — 77030035277 HC OBTRTR BLDELSS DISP INTU -B: Performed by: SURGERY

## 2019-05-16 PROCEDURE — 8E0W4CZ ROBOTIC ASSISTED PROCEDURE OF TRUNK REGION, PERCUTANEOUS ENDOSCOPIC APPROACH: ICD-10-PCS | Performed by: SURGERY

## 2019-05-16 PROCEDURE — 77030037032 HC INSRT SCIS CLICKLLINE DISP STOR -B: Performed by: SURGERY

## 2019-05-16 PROCEDURE — 77030027138 HC INCENT SPIROMETER -A

## 2019-05-16 PROCEDURE — 77030040259 HC STPLR DEV SUREFORM DVNCI INTU -F: Performed by: SURGERY

## 2019-05-16 PROCEDURE — 77030039266 HC ADH SKN EXOFIN S2SG -A: Performed by: SURGERY

## 2019-05-16 PROCEDURE — 77030012022 HC APPL CLP ENDOSC COVD -C: Performed by: SURGERY

## 2019-05-16 PROCEDURE — 0DB64Z3 EXCISION OF STOMACH, PERCUTANEOUS ENDOSCOPIC APPROACH, VERTICAL: ICD-10-PCS | Performed by: SURGERY

## 2019-05-16 PROCEDURE — 77030020782 HC GWN BAIR PAWS FLX 3M -B

## 2019-05-16 PROCEDURE — 77030032435: Performed by: SURGERY

## 2019-05-16 PROCEDURE — 77030020747 HC TU INSUF ENDOSC TELE -A: Performed by: SURGERY

## 2019-05-16 PROCEDURE — 74011000250 HC RX REV CODE- 250: Performed by: SURGERY

## 2019-05-16 PROCEDURE — 76060000038 HC ANESTHESIA 3.5 TO 4 HR: Performed by: SURGERY

## 2019-05-16 PROCEDURE — 74011000250 HC RX REV CODE- 250: Performed by: NURSE ANESTHETIST, CERTIFIED REGISTERED

## 2019-05-16 PROCEDURE — 77030031139 HC SUT VCRL2 J&J -A: Performed by: SURGERY

## 2019-05-16 PROCEDURE — 77030026438 HC STYL ET INTUB CARD -A: Performed by: NURSE ANESTHETIST, CERTIFIED REGISTERED

## 2019-05-16 PROCEDURE — 65660000000 HC RM CCU STEPDOWN

## 2019-05-16 PROCEDURE — 88307 TISSUE EXAM BY PATHOLOGIST: CPT

## 2019-05-16 PROCEDURE — 77030018836 HC SOL IRR NACL ICUM -A: Performed by: SURGERY

## 2019-05-16 PROCEDURE — 77030022871 HC STPL REINF STRP BAXT -C: Performed by: SURGERY

## 2019-05-16 PROCEDURE — 77030034029 HC SLV GASTRCTMY CAL SYS DISP BOEH -C: Performed by: SURGERY

## 2019-05-16 DEVICE — IMPLANTABLE DEVICE: Type: IMPLANTABLE DEVICE | Site: STOMACH | Status: FUNCTIONAL

## 2019-05-16 RX ORDER — SODIUM CHLORIDE 0.9 % (FLUSH) 0.9 %
5-40 SYRINGE (ML) INJECTION AS NEEDED
Status: DISCONTINUED | OUTPATIENT
Start: 2019-05-16 | End: 2019-05-16 | Stop reason: HOSPADM

## 2019-05-16 RX ORDER — BUPIVACAINE HYDROCHLORIDE AND EPINEPHRINE 5; 5 MG/ML; UG/ML
30 INJECTION, SOLUTION EPIDURAL; INTRACAUDAL; PERINEURAL ONCE
Status: COMPLETED | OUTPATIENT
Start: 2019-05-16 | End: 2019-05-16

## 2019-05-16 RX ORDER — ONDANSETRON 2 MG/ML
4 INJECTION INTRAMUSCULAR; INTRAVENOUS AS NEEDED
Status: DISCONTINUED | OUTPATIENT
Start: 2019-05-16 | End: 2019-05-16 | Stop reason: HOSPADM

## 2019-05-16 RX ORDER — ACETAMINOPHEN 325 MG/1
650 TABLET ORAL ONCE
Status: DISCONTINUED | OUTPATIENT
Start: 2019-05-16 | End: 2019-05-16 | Stop reason: SDUPTHER

## 2019-05-16 RX ORDER — PHENYLEPHRINE HCL IN 0.9% NACL 0.4MG/10ML
SYRINGE (ML) INTRAVENOUS AS NEEDED
Status: DISCONTINUED | OUTPATIENT
Start: 2019-05-16 | End: 2019-05-16 | Stop reason: HOSPADM

## 2019-05-16 RX ORDER — SODIUM CHLORIDE, SODIUM LACTATE, POTASSIUM CHLORIDE, CALCIUM CHLORIDE 600; 310; 30; 20 MG/100ML; MG/100ML; MG/100ML; MG/100ML
125 INJECTION, SOLUTION INTRAVENOUS CONTINUOUS
Status: DISCONTINUED | OUTPATIENT
Start: 2019-05-16 | End: 2019-05-17 | Stop reason: HOSPADM

## 2019-05-16 RX ORDER — SODIUM CHLORIDE 0.9 % (FLUSH) 0.9 %
5-40 SYRINGE (ML) INJECTION EVERY 8 HOURS
Status: DISCONTINUED | OUTPATIENT
Start: 2019-05-16 | End: 2019-05-17 | Stop reason: HOSPADM

## 2019-05-16 RX ORDER — LIDOCAINE HYDROCHLORIDE ANHYDROUS AND DEXTROSE MONOHYDRATE .8; 5 G/100ML; G/100ML
INJECTION, SOLUTION INTRAVENOUS
Status: DISCONTINUED | OUTPATIENT
Start: 2019-05-16 | End: 2019-05-16 | Stop reason: HOSPADM

## 2019-05-16 RX ORDER — PROPOFOL 10 MG/ML
INJECTION, EMULSION INTRAVENOUS AS NEEDED
Status: DISCONTINUED | OUTPATIENT
Start: 2019-05-16 | End: 2019-05-16 | Stop reason: HOSPADM

## 2019-05-16 RX ORDER — SCOLOPAMINE TRANSDERMAL SYSTEM 1 MG/1
1 PATCH, EXTENDED RELEASE TRANSDERMAL ONCE
Status: DISCONTINUED | OUTPATIENT
Start: 2019-05-16 | End: 2019-05-17 | Stop reason: HOSPADM

## 2019-05-16 RX ORDER — FENTANYL CITRATE 50 UG/ML
25 INJECTION, SOLUTION INTRAMUSCULAR; INTRAVENOUS
Status: DISCONTINUED | OUTPATIENT
Start: 2019-05-16 | End: 2019-05-16 | Stop reason: HOSPADM

## 2019-05-16 RX ORDER — DEXAMETHASONE SODIUM PHOSPHATE 4 MG/ML
INJECTION, SOLUTION INTRA-ARTICULAR; INTRALESIONAL; INTRAMUSCULAR; INTRAVENOUS; SOFT TISSUE AS NEEDED
Status: DISCONTINUED | OUTPATIENT
Start: 2019-05-16 | End: 2019-05-16 | Stop reason: HOSPADM

## 2019-05-16 RX ORDER — LIDOCAINE HYDROCHLORIDE ANHYDROUS AND DEXTROSE MONOHYDRATE .8; 5 G/100ML; G/100ML
1 INJECTION, SOLUTION INTRAVENOUS CONTINUOUS
Status: ACTIVE | OUTPATIENT
Start: 2019-05-16 | End: 2019-05-17

## 2019-05-16 RX ORDER — SODIUM CHLORIDE, SODIUM LACTATE, POTASSIUM CHLORIDE, CALCIUM CHLORIDE 600; 310; 30; 20 MG/100ML; MG/100ML; MG/100ML; MG/100ML
INJECTION, SOLUTION INTRAVENOUS
Status: DISCONTINUED | OUTPATIENT
Start: 2019-05-16 | End: 2019-05-16 | Stop reason: HOSPADM

## 2019-05-16 RX ORDER — FENTANYL CITRATE 50 UG/ML
INJECTION, SOLUTION INTRAMUSCULAR; INTRAVENOUS AS NEEDED
Status: DISCONTINUED | OUTPATIENT
Start: 2019-05-16 | End: 2019-05-16 | Stop reason: HOSPADM

## 2019-05-16 RX ORDER — SODIUM CHLORIDE, SODIUM LACTATE, POTASSIUM CHLORIDE, CALCIUM CHLORIDE 600; 310; 30; 20 MG/100ML; MG/100ML; MG/100ML; MG/100ML
125 INJECTION, SOLUTION INTRAVENOUS CONTINUOUS
Status: DISCONTINUED | OUTPATIENT
Start: 2019-05-16 | End: 2019-05-16 | Stop reason: HOSPADM

## 2019-05-16 RX ORDER — EPHEDRINE SULFATE/0.9% NACL/PF 50 MG/5 ML
SYRINGE (ML) INTRAVENOUS AS NEEDED
Status: DISCONTINUED | OUTPATIENT
Start: 2019-05-16 | End: 2019-05-16 | Stop reason: HOSPADM

## 2019-05-16 RX ORDER — DEXMEDETOMIDINE HYDROCHLORIDE 4 UG/ML
INJECTION, SOLUTION INTRAVENOUS AS NEEDED
Status: DISCONTINUED | OUTPATIENT
Start: 2019-05-16 | End: 2019-05-16 | Stop reason: HOSPADM

## 2019-05-16 RX ORDER — GABAPENTIN 100 MG/1
200 CAPSULE ORAL 2 TIMES DAILY
Status: DISCONTINUED | OUTPATIENT
Start: 2019-05-16 | End: 2019-05-17 | Stop reason: HOSPADM

## 2019-05-16 RX ORDER — ACETAMINOPHEN 500 MG
1000 TABLET ORAL EVERY 6 HOURS
Status: DISCONTINUED | OUTPATIENT
Start: 2019-05-16 | End: 2019-05-17 | Stop reason: HOSPADM

## 2019-05-16 RX ORDER — MIDAZOLAM HYDROCHLORIDE 1 MG/ML
1 INJECTION, SOLUTION INTRAMUSCULAR; INTRAVENOUS AS NEEDED
Status: DISCONTINUED | OUTPATIENT
Start: 2019-05-16 | End: 2019-05-16 | Stop reason: HOSPADM

## 2019-05-16 RX ORDER — HYDROMORPHONE HYDROCHLORIDE 1 MG/ML
1 INJECTION, SOLUTION INTRAMUSCULAR; INTRAVENOUS; SUBCUTANEOUS
Status: DISCONTINUED | OUTPATIENT
Start: 2019-05-16 | End: 2019-05-17 | Stop reason: HOSPADM

## 2019-05-16 RX ORDER — MORPHINE SULFATE 10 MG/ML
2 INJECTION, SOLUTION INTRAMUSCULAR; INTRAVENOUS
Status: DISCONTINUED | OUTPATIENT
Start: 2019-05-16 | End: 2019-05-16 | Stop reason: HOSPADM

## 2019-05-16 RX ORDER — SODIUM CHLORIDE 0.9 % (FLUSH) 0.9 %
5-40 SYRINGE (ML) INJECTION EVERY 8 HOURS
Status: DISCONTINUED | OUTPATIENT
Start: 2019-05-16 | End: 2019-05-16 | Stop reason: HOSPADM

## 2019-05-16 RX ORDER — LIDOCAINE HYDROCHLORIDE 10 MG/ML
0.1 INJECTION, SOLUTION EPIDURAL; INFILTRATION; INTRACAUDAL; PERINEURAL AS NEEDED
Status: DISCONTINUED | OUTPATIENT
Start: 2019-05-16 | End: 2019-05-16 | Stop reason: HOSPADM

## 2019-05-16 RX ORDER — MIDAZOLAM HYDROCHLORIDE 1 MG/ML
0.5 INJECTION, SOLUTION INTRAMUSCULAR; INTRAVENOUS
Status: DISCONTINUED | OUTPATIENT
Start: 2019-05-16 | End: 2019-05-16 | Stop reason: HOSPADM

## 2019-05-16 RX ORDER — ALBUMIN HUMAN 50 G/1000ML
SOLUTION INTRAVENOUS AS NEEDED
Status: DISCONTINUED | OUTPATIENT
Start: 2019-05-16 | End: 2019-05-16 | Stop reason: HOSPADM

## 2019-05-16 RX ORDER — HYDROMORPHONE HYDROCHLORIDE 1 MG/ML
0.2 INJECTION, SOLUTION INTRAMUSCULAR; INTRAVENOUS; SUBCUTANEOUS
Status: DISCONTINUED | OUTPATIENT
Start: 2019-05-16 | End: 2019-05-16 | Stop reason: HOSPADM

## 2019-05-16 RX ORDER — SODIUM CHLORIDE 9 MG/ML
25 INJECTION, SOLUTION INTRAVENOUS CONTINUOUS
Status: DISCONTINUED | OUTPATIENT
Start: 2019-05-16 | End: 2019-05-16 | Stop reason: HOSPADM

## 2019-05-16 RX ORDER — LIDOCAINE HYDROCHLORIDE 20 MG/ML
INJECTION, SOLUTION EPIDURAL; INFILTRATION; INTRACAUDAL; PERINEURAL AS NEEDED
Status: DISCONTINUED | OUTPATIENT
Start: 2019-05-16 | End: 2019-05-16 | Stop reason: HOSPADM

## 2019-05-16 RX ORDER — MAGNESIUM SULFATE HEPTAHYDRATE 40 MG/ML
INJECTION, SOLUTION INTRAVENOUS AS NEEDED
Status: DISCONTINUED | OUTPATIENT
Start: 2019-05-16 | End: 2019-05-16 | Stop reason: HOSPADM

## 2019-05-16 RX ORDER — DIPHENHYDRAMINE HYDROCHLORIDE 50 MG/ML
12.5 INJECTION, SOLUTION INTRAMUSCULAR; INTRAVENOUS AS NEEDED
Status: DISCONTINUED | OUTPATIENT
Start: 2019-05-16 | End: 2019-05-16 | Stop reason: HOSPADM

## 2019-05-16 RX ORDER — DIPHENHYDRAMINE HYDROCHLORIDE 50 MG/ML
12.5 INJECTION, SOLUTION INTRAMUSCULAR; INTRAVENOUS
Status: ACTIVE | OUTPATIENT
Start: 2019-05-16 | End: 2019-05-17

## 2019-05-16 RX ORDER — FENTANYL CITRATE 50 UG/ML
50 INJECTION, SOLUTION INTRAMUSCULAR; INTRAVENOUS AS NEEDED
Status: DISCONTINUED | OUTPATIENT
Start: 2019-05-16 | End: 2019-05-16 | Stop reason: HOSPADM

## 2019-05-16 RX ORDER — KETAMINE HYDROCHLORIDE 10 MG/ML
INJECTION, SOLUTION INTRAMUSCULAR; INTRAVENOUS AS NEEDED
Status: DISCONTINUED | OUTPATIENT
Start: 2019-05-16 | End: 2019-05-16 | Stop reason: HOSPADM

## 2019-05-16 RX ORDER — ROCURONIUM BROMIDE 10 MG/ML
INJECTION, SOLUTION INTRAVENOUS AS NEEDED
Status: DISCONTINUED | OUTPATIENT
Start: 2019-05-16 | End: 2019-05-16 | Stop reason: HOSPADM

## 2019-05-16 RX ORDER — GABAPENTIN 250 MG/5ML
600 SOLUTION ORAL ONCE
Status: COMPLETED | OUTPATIENT
Start: 2019-05-16 | End: 2019-05-16

## 2019-05-16 RX ORDER — HYOSCYAMINE SULFATE 0.12 MG/1
0.12 TABLET SUBLINGUAL
Status: DISCONTINUED | OUTPATIENT
Start: 2019-05-16 | End: 2019-05-17 | Stop reason: HOSPADM

## 2019-05-16 RX ORDER — LORAZEPAM 2 MG/ML
0.5 INJECTION INTRAMUSCULAR
Status: DISCONTINUED | OUTPATIENT
Start: 2019-05-16 | End: 2019-05-17 | Stop reason: HOSPADM

## 2019-05-16 RX ORDER — ENOXAPARIN SODIUM 100 MG/ML
40 INJECTION SUBCUTANEOUS EVERY 24 HOURS
Status: DISCONTINUED | OUTPATIENT
Start: 2019-05-16 | End: 2019-05-16

## 2019-05-16 RX ORDER — ROPIVACAINE HYDROCHLORIDE 5 MG/ML
30 INJECTION, SOLUTION EPIDURAL; INFILTRATION; PERINEURAL ONCE
Status: DISCONTINUED | OUTPATIENT
Start: 2019-05-16 | End: 2019-05-16 | Stop reason: HOSPADM

## 2019-05-16 RX ORDER — NALOXONE HYDROCHLORIDE 0.4 MG/ML
0.4 INJECTION, SOLUTION INTRAMUSCULAR; INTRAVENOUS; SUBCUTANEOUS AS NEEDED
Status: DISCONTINUED | OUTPATIENT
Start: 2019-05-16 | End: 2019-05-17 | Stop reason: HOSPADM

## 2019-05-16 RX ORDER — SODIUM CHLORIDE 0.9 % (FLUSH) 0.9 %
5-40 SYRINGE (ML) INJECTION AS NEEDED
Status: DISCONTINUED | OUTPATIENT
Start: 2019-05-16 | End: 2019-05-17 | Stop reason: HOSPADM

## 2019-05-16 RX ORDER — ONDANSETRON 2 MG/ML
4 INJECTION INTRAMUSCULAR; INTRAVENOUS
Status: DISCONTINUED | OUTPATIENT
Start: 2019-05-16 | End: 2019-05-17 | Stop reason: HOSPADM

## 2019-05-16 RX ORDER — ONDANSETRON 2 MG/ML
INJECTION INTRAMUSCULAR; INTRAVENOUS AS NEEDED
Status: DISCONTINUED | OUTPATIENT
Start: 2019-05-16 | End: 2019-05-16 | Stop reason: HOSPADM

## 2019-05-16 RX ORDER — SUCCINYLCHOLINE CHLORIDE 20 MG/ML
INJECTION INTRAMUSCULAR; INTRAVENOUS AS NEEDED
Status: DISCONTINUED | OUTPATIENT
Start: 2019-05-16 | End: 2019-05-16 | Stop reason: HOSPADM

## 2019-05-16 RX ORDER — MIDAZOLAM HYDROCHLORIDE 1 MG/ML
INJECTION, SOLUTION INTRAMUSCULAR; INTRAVENOUS AS NEEDED
Status: DISCONTINUED | OUTPATIENT
Start: 2019-05-16 | End: 2019-05-16 | Stop reason: HOSPADM

## 2019-05-16 RX ORDER — SODIUM CHLORIDE, SODIUM LACTATE, POTASSIUM CHLORIDE, CALCIUM CHLORIDE 600; 310; 30; 20 MG/100ML; MG/100ML; MG/100ML; MG/100ML
75 INJECTION, SOLUTION INTRAVENOUS CONTINUOUS
Status: DISCONTINUED | OUTPATIENT
Start: 2019-05-16 | End: 2019-05-16 | Stop reason: HOSPADM

## 2019-05-16 RX ADMIN — ROCURONIUM BROMIDE 10 MG: 10 INJECTION, SOLUTION INTRAVENOUS at 10:40

## 2019-05-16 RX ADMIN — ONDANSETRON 4 MG: 2 INJECTION INTRAMUSCULAR; INTRAVENOUS at 11:09

## 2019-05-16 RX ADMIN — Medication 40 MCG: at 09:46

## 2019-05-16 RX ADMIN — DEXMEDETOMIDINE HYDROCHLORIDE 10 MCG: 4 INJECTION, SOLUTION INTRAVENOUS at 10:42

## 2019-05-16 RX ADMIN — KETAMINE HYDROCHLORIDE 30 MG: 10 INJECTION, SOLUTION INTRAMUSCULAR; INTRAVENOUS at 08:09

## 2019-05-16 RX ADMIN — ROCURONIUM BROMIDE 20 MG: 10 INJECTION, SOLUTION INTRAVENOUS at 09:37

## 2019-05-16 RX ADMIN — SODIUM CHLORIDE, SODIUM LACTATE, POTASSIUM CHLORIDE, CALCIUM CHLORIDE: 600; 310; 30; 20 INJECTION, SOLUTION INTRAVENOUS at 08:05

## 2019-05-16 RX ADMIN — CEFAZOLIN 3 G: 1 INJECTION, POWDER, FOR SOLUTION INTRAMUSCULAR; INTRAVENOUS; PARENTERAL at 08:25

## 2019-05-16 RX ADMIN — Medication 120 MCG: at 08:51

## 2019-05-16 RX ADMIN — GABAPENTIN 600 MG: 250 SOLUTION ORAL at 07:23

## 2019-05-16 RX ADMIN — SUGAMMADEX 250 MG: 100 INJECTION, SOLUTION INTRAVENOUS at 11:07

## 2019-05-16 RX ADMIN — LIDOCAINE HYDROCHLORIDE 100 MG: 20 INJECTION, SOLUTION EPIDURAL; INFILTRATION; INTRACAUDAL; PERINEURAL at 08:09

## 2019-05-16 RX ADMIN — ACETAMINOPHEN 1000 MG: 500 TABLET ORAL at 19:57

## 2019-05-16 RX ADMIN — ENOXAPARIN SODIUM 40 MG: 100 INJECTION SUBCUTANEOUS at 07:38

## 2019-05-16 RX ADMIN — ALBUMIN HUMAN 250 ML: 50 SOLUTION INTRAVENOUS at 08:30

## 2019-05-16 RX ADMIN — SODIUM CHLORIDE, SODIUM LACTATE, POTASSIUM CHLORIDE, CALCIUM CHLORIDE: 600; 310; 30; 20 INJECTION, SOLUTION INTRAVENOUS at 07:30

## 2019-05-16 RX ADMIN — ROCURONIUM BROMIDE 10 MG: 10 INJECTION, SOLUTION INTRAVENOUS at 08:09

## 2019-05-16 RX ADMIN — SUCCINYLCHOLINE CHLORIDE 180 MG: 20 INJECTION INTRAMUSCULAR; INTRAVENOUS at 08:09

## 2019-05-16 RX ADMIN — Medication 80 MCG: at 09:35

## 2019-05-16 RX ADMIN — ACETAMINOPHEN 1000 MG: 500 TABLET ORAL at 14:47

## 2019-05-16 RX ADMIN — MAGNESIUM SULFATE HEPTAHYDRATE 2 G: 40 INJECTION, SOLUTION INTRAVENOUS at 08:20

## 2019-05-16 RX ADMIN — PROPOFOL 200 MG: 10 INJECTION, EMULSION INTRAVENOUS at 08:09

## 2019-05-16 RX ADMIN — SODIUM CHLORIDE, SODIUM LACTATE, POTASSIUM CHLORIDE, AND CALCIUM CHLORIDE 125 ML/HR: 600; 310; 30; 20 INJECTION, SOLUTION INTRAVENOUS at 11:52

## 2019-05-16 RX ADMIN — MIDAZOLAM HYDROCHLORIDE 2 MG: 1 INJECTION, SOLUTION INTRAMUSCULAR; INTRAVENOUS at 07:56

## 2019-05-16 RX ADMIN — MIDAZOLAM HYDROCHLORIDE 2 MG: 1 INJECTION, SOLUTION INTRAMUSCULAR; INTRAVENOUS at 07:59

## 2019-05-16 RX ADMIN — Medication 80 MCG: at 08:22

## 2019-05-16 RX ADMIN — GABAPENTIN 200 MG: 100 CAPSULE ORAL at 17:19

## 2019-05-16 RX ADMIN — Medication 100 MCG: at 08:33

## 2019-05-16 RX ADMIN — Medication 80 MCG: at 08:37

## 2019-05-16 RX ADMIN — DEXAMETHASONE SODIUM PHOSPHATE 8 MG: 4 INJECTION, SOLUTION INTRA-ARTICULAR; INTRALESIONAL; INTRAMUSCULAR; INTRAVENOUS; SOFT TISSUE at 08:15

## 2019-05-16 RX ADMIN — LIDOCAINE HYDROCHLORIDE ANHYDROUS AND DEXTROSE MONOHYDRATE 2 MG/KG/HR: .8; 5 INJECTION, SOLUTION INTRAVENOUS at 08:15

## 2019-05-16 RX ADMIN — LIDOCAINE HYDROCHLORIDE 1 MG/KG/HR: 8 INJECTION, SOLUTION INTRAVENOUS at 11:51

## 2019-05-16 RX ADMIN — ROCURONIUM BROMIDE 40 MG: 10 INJECTION, SOLUTION INTRAVENOUS at 08:30

## 2019-05-16 RX ADMIN — KETAMINE HYDROCHLORIDE 20 MG: 10 INJECTION, SOLUTION INTRAMUSCULAR; INTRAVENOUS at 08:15

## 2019-05-16 RX ADMIN — Medication 120 MCG: at 08:53

## 2019-05-16 RX ADMIN — ACETAMINOPHEN 1000 MG: 650 SOLUTION ORAL at 07:24

## 2019-05-16 RX ADMIN — Medication 5 MG: at 08:53

## 2019-05-16 RX ADMIN — FENTANYL CITRATE 100 MCG: 50 INJECTION, SOLUTION INTRAMUSCULAR; INTRAVENOUS at 08:09

## 2019-05-16 RX ADMIN — SODIUM CHLORIDE, SODIUM LACTATE, POTASSIUM CHLORIDE, AND CALCIUM CHLORIDE 125 ML/HR: 600; 310; 30; 20 INJECTION, SOLUTION INTRAVENOUS at 22:58

## 2019-05-16 RX ADMIN — DEXMEDETOMIDINE HYDROCHLORIDE 10 MCG: 4 INJECTION, SOLUTION INTRAVENOUS at 11:24

## 2019-05-16 RX ADMIN — Medication 80 MCG: at 08:28

## 2019-05-16 RX ADMIN — SODIUM CHLORIDE, SODIUM LACTATE, POTASSIUM CHLORIDE, AND CALCIUM CHLORIDE 125 ML/HR: 600; 310; 30; 20 INJECTION, SOLUTION INTRAVENOUS at 16:08

## 2019-05-16 NOTE — ANESTHESIA POSTPROCEDURE EVALUATION
Post-Anesthesia Evaluation and Assessment Patient: Ang Jaeger MRN: [id number]  SSN: [id number] YOB: 1980  Age: 34 y.o. Sex: male I have evaluated the patient and they are stable and ready for discharge from the PACU. Cardiovascular Function/Vital Signs Visit Vitals /87 Pulse 77 Temp 37.2 °C (98.9 °F) Resp 21 Ht 5' 10\" (1.778 m) Wt 124.7 kg (275 lb) SpO2 97% BMI 39.46 kg/m² Patient is status post General anesthesia for Procedure(s): 
ROBOTIC ASSISTED LAPAROSCOPIC SLEEVE GASTRECTOMY, WITH EGD. ERAS 
ESOPHAGOGASTRODUODENOSCOPY (EGD). Nausea/Vomiting: None Postoperative hydration reviewed and adequate. Pain: 
Pain Scale 1: Numeric (0 - 10) (05/16/19 1215) Pain Intensity 1: 0 (05/16/19 1215) Managed Neurological Status:  
Neuro (WDL): Within Defined Limits (05/16/19 1145) Neuro Neurologic State: Drowsy (05/16/19 1215) LUE Motor Response: Purposeful (05/16/19 1215) LLE Motor Response: Purposeful (05/16/19 1215) RUE Motor Response: Purposeful (05/16/19 1215) RLE Motor Response: Purposeful (05/16/19 1215) At baseline Mental Status, Level of Consciousness: Alert and  oriented to person, place, and time Pulmonary Status:  
O2 Device: Nasal cannula (05/16/19 1215) Adequate oxygenation and airway patent Complications related to anesthesia: None Post-anesthesia assessment completed. No concerns Signed By: Herve Acuña MD   
 May 16, 2019 Procedure(s): 
ROBOTIC ASSISTED LAPAROSCOPIC SLEEVE GASTRECTOMY, WITH EGD. ERAS 
ESOPHAGOGASTRODUODENOSCOPY (EGD). general 
 
<BSHSIANPOST> Vitals Value Taken Time /87 5/16/2019 12:45 PM  
Temp 37.2 °C (98.9 °F) 5/16/2019 12:15 PM  
Pulse 69 5/16/2019 12:55 PM  
Resp 21 5/16/2019 12:55 PM  
SpO2 97 % 5/16/2019 12:55 PM  
Vitals shown include unvalidated device data.

## 2019-05-16 NOTE — BRIEF OP NOTE
BRIEF OPERATIVE NOTE Date of Procedure: 5/16/2019 Preoperative Diagnosis: MORBID ORBESTIY Postoperative Diagnosis: MORBID ORBESTIY Procedure(s): 
ROBOTIC ASSISTED LAPAROSCOPIC SLEEVE GASTRECTOMY, WITH EGD. ERAS 
ESOPHAGOGASTRODUODENOSCOPY (EGD) Surgeon(s) and Role: 
   Angella Saldana MD - Primary Surgical Staff: 
Circ-1: John Turner RN Scrub Tech-1: La Lee Scrub RN-Relief: Angeles Moore RN Surg Asst-1: Raffaele Hayden Event Time In Time Out Incision Start 0840 Incision Close Anesthesia: General  
Estimated Blood Loss: 20cc Specimens:  
ID Type Source Tests Collected by Time Destination 1 : PARTIAL GASTRECTOMY Fresh Stomach  Kassandra Abdul MD 5/16/2019 1103 Pathology Findings: normal sleeve gastrectomy over a 40Fr Visigi Bougie, normal post op EGD, negative leak test 
Complications: none Implants:  
Implant Name Type Inv. Item Serial No.  Lot No. LRB No. Used Action [employer]   NA  [id number] N/A 5 Implanted [employer]   NA  [id number] N/A 1 Implanted

## 2019-05-16 NOTE — PERIOP NOTES
Patient: Candy Boyle MRN: [id number]  SSN: [id number] YOB: 1980  Age: 34 y.o. Sex: male Patient is status post Procedure(s): 
ROBOTIC ASSISTED LAPAROSCOPIC SLEEVE GASTRECTOMY, WITH EGD. ERAS 
ESOPHAGOGASTRODUODENOSCOPY (EGD). Surgeon(s) and Role: 
   Erica Calero MD - Primary Local/Dose/Irrigation:  0.5% BUPIVACAINE W EPI Peripheral IV 05/16/19 Left Hand (Active) Airway - Endotracheal Tube 05/16/19 Oral (Active) Dressing/Packing:  Wound Abdomen 5 INCISIONS-Dressing Type: Topical skin adhesive/glue (05/16/19 1100) Splint/Cast:  ] Other:

## 2019-05-16 NOTE — INTERVAL H&P NOTE
H&P Update: 
Leanne Singh was seen and examined. History and physical has been reviewed. The patient has been examined. There have been no significant clinical changes since the completion of the originally dated History and Physical. 
Patient identified by surgeon; surgical site was confirmed by patient and surgeon.

## 2019-05-16 NOTE — PERIOP NOTES
TRANSFER - OUT REPORT: 
 
Verbal report given to Shanta Lara RN(name) on Burnie Pollen  being transferred to [facility](unit) for routine post - op Report consisted of patients Situation, Background, Assessment and  
Recommendations(SBAR). Information from the following report(s) SBAR, Kardex, OR Summary, Procedure Summary, Intake/Output and Cardiac Rhythm NSR was reviewed with the receiving nurse. Lines:  
Peripheral IV 05/16/19 Left Hand (Active) Site Assessment Clean, dry, & intact 5/16/2019 11:45 AM  
Phlebitis Assessment 0 5/16/2019 11:45 AM  
Infiltration Assessment 0 5/16/2019 11:45 AM  
Dressing Status Clean, dry, & intact 5/16/2019 11:45 AM  
Dressing Type Transparent;Tape 5/16/2019 11:45 AM  
Hub Color/Line Status Pink; Infusing 5/16/2019 11:45 AM  
Action Taken Open ports on tubing capped 5/16/2019 11:45 AM  
Alcohol Cap Used Yes 5/16/2019 11:45 AM  
   
Peripheral IV 05/16/19 Posterior;Right Hand (Active) Site Assessment Clean, dry, & intact 5/16/2019 11:45 AM  
Phlebitis Assessment 0 5/16/2019 11:45 AM  
Infiltration Assessment 0 5/16/2019 11:45 AM  
Dressing Status Clean, dry, & intact 5/16/2019 11:45 AM  
Dressing Type Transparent;Tape 5/16/2019 11:45 AM  
Hub Color/Line Status Pink; Infusing 5/16/2019 11:45 AM  
Action Taken Open ports on tubing capped 5/16/2019 11:45 AM  
Alcohol Cap Used Yes 5/16/2019 11:45 AM  
  
 
Opportunity for questions and clarification was provided. Patient transported with: 
 O2 @ 2 liters Tech

## 2019-05-16 NOTE — PERIOP NOTES
0.9% NORMAL SALINE, 1 LITER, USED PRN ON STERILE FIELD. 
 
1 LITER NORMAL SALINE USE PRN VIA SUCTION .

## 2019-05-16 NOTE — PROGRESS NOTES
TRANSFER - IN REPORT: 
 
Verbal report received from Inna(name) on Burnie Pollen  being received from [facility]) for routine post - op Report consisted of patients Situation, Background, Assessment and  
Recommendations(SBAR). Information from the following report(s) OR Summary was reviewed with the receiving nurse. Opportunity for questions and clarification was provided. Assessment completed upon patients arrival to unit and care assumed.

## 2019-05-16 NOTE — ANESTHESIA PREPROCEDURE EVALUATION
Relevant Problems No relevant active problems Anesthetic History No history of anesthetic complications Review of Systems / Medical History Patient summary reviewed, nursing notes reviewed and pertinent labs reviewed Pulmonary Within defined limits Sleep apnea: No treatment Neuro/Psych Within defined limits Cardiovascular Within defined limits Hypertension: well controlled GI/Hepatic/Renal 
Within defined limits GERD: well controlled Endo/Other Within defined limits Morbid obesity and arthritis Other Findings Physical Exam 
 
Airway Mallampati: II 
TM Distance: > 6 cm Neck ROM: normal range of motion Mouth opening: Normal 
 
 Cardiovascular Regular rate and rhythm,  S1 and S2 normal,  no murmur, click, rub, or gallop Dental 
No notable dental hx Pulmonary Breath sounds clear to auscultation Abdominal 
GI exam deferred Other Findings Anesthetic Plan ASA: 3 Anesthesia type: general 
 
 
 
 
Induction: Intravenous Anesthetic plan and risks discussed with: Patient

## 2019-05-16 NOTE — PERIOP NOTES
CHELSEA FROM [employer]. SET ON 3, PAD ON LEFT LATERAL THIGH. PER PAD SITE CLEAR, NO REDNESS OR SWELLING NOTED.

## 2019-05-16 NOTE — OP NOTES
[address]   OPERATIVE REPORT    Name:  Holly Griffin  MR#:  [id number]  :  1980  ACCOUNT #:  [de-identified]  DATE OF SERVICE:  2019    PREOPERATIVE DIAGNOSIS:  Morbid obesity. POSTOPERATIVE DIAGNOSIS:  Morbid obesity. PROCEDURE PERFORMED:  Robotic-assisted laparoscopic sleeve gastrectomy with esophagogastroduodenoscopy. SURGEON:  Irish Shen MD    ASSISTANT:  Zayra Galeano SA    ANESTHESIA:  General.    COMPLICATIONS:  None. SPECIMENS REMOVED:  Partial gastrectomy. IMPLANTS:  Alysa-Strips staple line reinforcement. ESTIMATED BLOOD LOSS:  20 mL. FINDINGS:  Normal sleeve gastrectomy over a 40-[place] ViSiGi bougie. Normal postoperative EGD and no leak. INDICATIONS FOR THE OPERATION:  The patient is a 49-year-old male who has a history of morbid obesity with a BMI of 41 with bariatric comorbidities including obstructive sleep apnea, osteoarthritis, hypertension, and gastroesophageal reflux disease, who has been preoperatively evaluated for bariatric surgery. He has been through the necessary preoperative education and is now ready for surgery. DESCRIPTION OF THE OPERATION:  The patient was met in the preoperative holding area. The H and P was updated. Consent was signed. All risks and benefits were explained to the patient prior to the start of the operation. He was taken back to the operating room. He was lying in a supine position. The abdomen was prepped and draped in standard sterile fashion. Time-out was called. Antibiotics were given. SCDs were on the lower extremities. We started the operation by making an 8-mm incision into the left upper quadrant inserting a [address] trocar into the intra-abdominal cavity. We then placed an 8-mm da Juaquin trocar superior into the left of the umbilicus, a 12 mm port just to the right of the umbilicus, and then placed a right upper quadrant 8 mm port.   We placed a subxiphoid liver retractor lifting the liver up and out of the way. Once the liver was up and out of the way, we did have an OG tube passed down the stomach to decompress the stomach. We docked the [employer] robot into the patient and had the patient placed in the steep reverse Trendelenburg position. Once we had the [employer] robot docked on to the patient, we then measured an area about 6 cm proximal to pylorus and marked an area on the gastrocolic ligament which would be the distal most portion of our sleeve staple line. We then started taking down the gastrocolic ligament from the mid body of the stomach, traveling up superiorly all the way along and up to the angle of His. He had a lot of posterior gastric attachments and also a lot of posterior fundus that was adherent to the retroperitoneum. We took all that down with care. We exposed the left gloria, and once we had the stomach fully mobilized, we took off some of the fat pad and removed that from the patient. We then took down the rest of the gastrocolic ligament to our riky about 6 cm proximal to pylorus. We then had the stomach fully mobilized. We then passed our [address] tube into the stomach and down along the lesser curve to the pylorus. It was in perfect positioning along the lesser curve and down at the pylorus. We then hooked to the suction and then started creating our vertical sleeve gastrectomy. We used a black load with reinforcement of the da Juaquin stapler. We then used 5 more green loads with Alysa-Strips for reinforcement. With the first black fire, we did use Alysa-Strips on the stomach. We then did use the 5 green loads with Alysa-Strip for reinforcement all the way up creating a nice vertical sleeve gastrectomy, making sure we removed all the posterior fundus. We then removed our partial gastrectomy specimen and placed it into the right upper quadrant away from the operation.   We then had the ViSiGi tube removed from the stomach and then oversewed the stomach with a 2-0 reabsorbable V-Loc suture starting at the angle of His area staple line and oversewing it all the way down past the mid body of the stomach. We looked at the staple line and it appeared to be hemostatic. We did place some clips on the first firing of the black load stapler to assure hemostasis of the staple line on the distal most portion. With everything appearing hemostatic, we removed our needle and passed it off the field. We then performed our endoscopy. We placed the endoscope down the mouth into the esophagus and down into the stomach. The sleeve stomach had a normal post sleeve appearance. There was no bleeding from the staple line internally, and there were no leaks during our leak test.  We were able to get the scope all the way down to the pylorus as well and into the duodenum which had a normal appearance and then we desufflated the stomach, removed the endoscope, and then removed our partial gastrectomy specimen from the 12-mm port fascial defect and widened that area. We then inspected our operative field once more. We closed our 12-mm port fascial defect with an Endoclose suture passing device in interrupted figure-of-eight fashion. We then removed our subxiphoid  liver retractor. The operative area was hemostatic. We then desufflated the abdominal cavity, removed the trocars, and closed the skin with 4-0 Monocryl and Dermabond to complete the operation. Dr. Lucinda Nicole was present and scrubbed during the entire operation. The counts were correct.         Herve Price MD      [id number]  D:  05/16/2019 11:29  T:  05/16/2019 11:36  JOB #:  [id number]

## 2019-05-17 VITALS
OXYGEN SATURATION: 93 % | RESPIRATION RATE: 18 BRPM | HEART RATE: 64 BPM | BODY MASS INDEX: 39.37 KG/M2 | SYSTOLIC BLOOD PRESSURE: 133 MMHG | HEIGHT: 70 IN | TEMPERATURE: 98.9 F | WEIGHT: 275 LBS | DIASTOLIC BLOOD PRESSURE: 77 MMHG

## 2019-05-17 LAB
ANION GAP SERPL CALC-SCNC: 9 MMOL/L (ref 5–15)
BUN SERPL-MCNC: 11 MG/DL (ref 6–20)
BUN/CREAT SERPL: 15 (ref 12–20)
CALCIUM SERPL-MCNC: 9 MG/DL (ref 8.5–10.1)
CHLORIDE SERPL-SCNC: 106 MMOL/L (ref 97–108)
CO2 SERPL-SCNC: 24 MMOL/L (ref 21–32)
CREAT SERPL-MCNC: 0.72 MG/DL (ref 0.7–1.3)
ERYTHROCYTE [DISTWIDTH] IN BLOOD BY AUTOMATED COUNT: 12.5 % (ref 11.5–14.5)
GLUCOSE SERPL-MCNC: 112 MG/DL (ref 65–100)
HCT VFR BLD AUTO: 42.3 % (ref 36.6–50.3)
HGB BLD-MCNC: 13.9 G/DL (ref 12.1–17)
MCH RBC QN AUTO: 30 PG (ref 26–34)
MCHC RBC AUTO-ENTMCNC: 32.9 G/DL (ref 30–36.5)
MCV RBC AUTO: 91.2 FL (ref 80–99)
NRBC # BLD: 0 K/UL (ref 0–0.01)
NRBC BLD-RTO: 0 PER 100 WBC
PLATELET # BLD AUTO: 329 K/UL (ref 150–400)
PMV BLD AUTO: 10.5 FL (ref 8.9–12.9)
POTASSIUM SERPL-SCNC: 4.2 MMOL/L (ref 3.5–5.1)
RBC # BLD AUTO: 4.64 M/UL (ref 4.1–5.7)
SODIUM SERPL-SCNC: 139 MMOL/L (ref 136–145)
WBC # BLD AUTO: 16.4 K/UL (ref 4.1–11.1)

## 2019-05-17 PROCEDURE — 94762 N-INVAS EAR/PLS OXIMTRY CONT: CPT

## 2019-05-17 PROCEDURE — 74011250636 HC RX REV CODE- 250/636: Performed by: SURGERY

## 2019-05-17 PROCEDURE — 74011250637 HC RX REV CODE- 250/637: Performed by: SURGERY

## 2019-05-17 PROCEDURE — 85027 COMPLETE CBC AUTOMATED: CPT

## 2019-05-17 PROCEDURE — 80048 BASIC METABOLIC PNL TOTAL CA: CPT

## 2019-05-17 PROCEDURE — 36415 COLL VENOUS BLD VENIPUNCTURE: CPT

## 2019-05-17 RX ORDER — ENOXAPARIN SODIUM 100 MG/ML
40 INJECTION SUBCUTANEOUS EVERY 24 HOURS
Status: DISCONTINUED | OUTPATIENT
Start: 2019-05-17 | End: 2019-05-17 | Stop reason: HOSPADM

## 2019-05-17 RX ORDER — HYDROMORPHONE HYDROCHLORIDE 2 MG/1
2-4 TABLET ORAL
Qty: 40 TAB | Refills: 0 | Status: SHIPPED | OUTPATIENT
Start: 2019-05-17 | End: 2019-05-20

## 2019-05-17 RX ORDER — HYDROMORPHONE HYDROCHLORIDE 2 MG/1
4 TABLET ORAL
Status: DISCONTINUED | OUTPATIENT
Start: 2019-05-17 | End: 2019-05-17 | Stop reason: HOSPADM

## 2019-05-17 RX ORDER — ENOXAPARIN SODIUM 100 MG/ML
40 INJECTION SUBCUTANEOUS DAILY
Qty: 12 SYRINGE | Refills: 0 | Status: SHIPPED | OUTPATIENT
Start: 2019-05-17 | End: 2019-05-17

## 2019-05-17 RX ADMIN — ACETAMINOPHEN 1000 MG: 500 TABLET ORAL at 07:45

## 2019-05-17 RX ADMIN — SODIUM CHLORIDE, SODIUM LACTATE, POTASSIUM CHLORIDE, AND CALCIUM CHLORIDE 125 ML/HR: 600; 310; 30; 20 INJECTION, SOLUTION INTRAVENOUS at 13:14

## 2019-05-17 RX ADMIN — Medication 10 ML: at 13:13

## 2019-05-17 RX ADMIN — ENOXAPARIN SODIUM 40 MG: 40 INJECTION SUBCUTANEOUS at 08:33

## 2019-05-17 RX ADMIN — GABAPENTIN 200 MG: 100 CAPSULE ORAL at 08:31

## 2019-05-17 RX ADMIN — ACETAMINOPHEN 1000 MG: 500 TABLET ORAL at 03:00

## 2019-05-17 RX ADMIN — SODIUM CHLORIDE, SODIUM LACTATE, POTASSIUM CHLORIDE, AND CALCIUM CHLORIDE 125 ML/HR: 600; 310; 30; 20 INJECTION, SOLUTION INTRAVENOUS at 05:12

## 2019-05-17 NOTE — PROGRESS NOTES
Care Management Interventions PCP Verified by CM: Yes 
Palliative Care Criteria Met (RRAT>21 & CHF Dx)?: No 
MyChart Signup: No 
Discharge Durable Medical Equipment: No 
Health Maintenance Reviewed: Yes Physical Therapy Consult: No 
Occupational Therapy Consult: No 
Speech Therapy Consult: No 
Current Support Network: Lives with Spouse Confirm Follow Up Transport: Family Plan discussed with Pt/Family/Caregiver: Yes Freedom of Choice Offered: Yes [employer] Information Provided?: No 
Discharge Location Discharge Placement: Home with family assistance Reason for Admission:   Bariatric surgery RRAT Score:      0 Plan for utilizing home health:  Not indicated Likelihood of Readmission:  low Transition of Care Plan:      Chart reviewed for transitions of care, discussed patient during rounds. Cm met with patient and his wife at the bedside to explain role and offer support. Patient is discharging home this afternoon, wife to provide transport. No other needs at this time.  
Alla STUBBSW, ACM

## 2019-05-17 NOTE — DISCHARGE SUMMARY
Physician Discharge Summary     Patient ID:  Alba Silva  [id number]  34 y.o.  1980    Allergies: Bee venom protein (honey bee); Hydrocodone; and Oxycodone    Admit Date: 5/16/2019    Discharge Date: 5/17/2019    * Admission Diagnoses: Morbid obesity with BMI of 40.0-44.9, adult ([facility] [E66.01, Z68.41]    * Discharge Diagnoses:    Hospital Problems as of 5/17/2019 Date Reviewed: 4/23/2019          Codes Class Noted - Resolved POA    Morbid obesity with BMI of 40.0-44.9, adult ([facility] ICD-10-CM: E66.01, Z68.41  ICD-9-CM: 278.01, V85.41  5/16/2019 - Present Unknown               Admission Condition: Good    * Discharge Condition: good    * Procedures: Procedure(s):  ROBOTIC ASSISTED LAPAROSCOPIC SLEEVE GASTRECTOMY, WITH EGD. ERAS  ESOPHAGOGASTRODUODENOSCOPY (EGD)    * Hospital Course:   Patient tolerating bariatric full liquids. No vomiting. Discharged home POD 1 from Sleeve gastrectomy. Consults: None    Significant Diagnostic Studies: none    * Disposition: Home    Discharge Medications:   Current Discharge Medication List      START taking these medications    Details   HYDROmorphone (DILAUDID) 2 mg tablet Take 1-2 Tabs by mouth every four (4) hours as needed for Pain for up to 3 days. Max Daily Amount: 24 mg. Qty: 40 Tab, Refills: 0    Associated Diagnoses: Morbid obesity with BMI of 40.0-44.9, adult ([facility])      enoxaparin (LOVENOX) 40 mg/0.4 mL 0.4 mL by SubCUTAneous route daily for 12 days. Qty: 12 Syringe, Refills: 0         CONTINUE these medications which have NOT CHANGED    Details   butalbital-acetaminophen-caffeine (FIORICET, ESGIC) -40 mg per tablet Take 2 Tabs by mouth every six (6) hours as needed for Headache. Max Daily Amount: 6 Tabs. Qty: 30 Tab, Refills: 0         STOP taking these medications       ibuprofen (MOTRIN) 200 mg tablet Comments:   Reason for Stopping:               * Follow-up Care/Patient Instructions:   Activity: No lifting, Driving, or Strenuous exercise for 2 weeks  Diet: Bariatric liquids.    Wound Care: Keep wound clean and dry    Follow-up Information     Follow up With Specialties Details Why Contact Info    April Chino MD General Surgery Go in 2 weeks For wound re-check [address] [place], [address]  [phone]          Follow-up tests/labs na    Signed:  Yonis Hall NP  5/17/2019  2:39 PM

## 2019-05-17 NOTE — PROGRESS NOTES
NUTRITION Chart reviewed. Post-op bariatric diet instruction completed. Will gladly follow up for additional questions as needed. Thank you.   
 
Mike Burns RD

## 2019-05-17 NOTE — PROGRESS NOTES
[address] [facility] Surgery POD #1 Subjective DOign well, minimal pain, no N/V, OOB Objective Patient Vitals for the past 24 hrs: 
 Temp Pulse Resp BP SpO2  
05/17/19 0432 99 °F (37.2 °C) 69 18 141/90 95 % 05/16/19 2320 98.5 °F (36.9 °C) 65 18 152/88 96 % 05/16/19 2007 99.6 °F (37.6 °C) 71 18 (!) 154/92 97 % 05/16/19 1637 98.5 °F (36.9 °C) 65 18 151/85 98 % 05/16/19 1441 98.7 °F (37.1 °C) 70 20 131/83 98 % 05/16/19 1315 98.3 °F (36.8 °C) 78 20 142/85 98 % 05/16/19 1245  77 21 141/87 97 % 05/16/19 1230  74 22 139/82 97 % 05/16/19 1215 98.9 °F (37.2 °C) 84 24 144/81 98 % 05/16/19 1205  75 18  98 % 05/16/19 1200  89 23 135/81 94 % 05/16/19 1155  78 23 137/81 98 % 05/16/19 1150  80 23 141/81 98 % 05/16/19 1145 98.6 °F (37 °C) 81 22 144/85 98 % 05/16/19 1143 98.6 °F (37 °C) 81 18 134/90 96 % 05/16/19 1142    134/90 97 % 05/16/19 0715 98.2 °F (36.8 °C) 70  110/73 95 % Date 05/16/19 0700 - 05/17/19 5303 05/17/19 0700 - 05/18/19 9887 Shift 8163-2763 6886-9137 24 Hour Total 0983-6974 0810-8108 24 Hour Total  
INTAKE  
I.V.(mL/kg/hr) 1500(1) 2364.6 3864.6 I.V. 300  300 Volume (lactated Ringers infusion) 900  900 Volume (lactated Ringers infusion)  2364.6 2364.6 Volume (lactated Ringers infusion) 300  300 Shift Total(mL/kg) [id number]) [id number]) [id number])     
OUTPUT Urine(mL/kg/hr) 400(0.3) 2500 2900 Urine Voided 400 2500 2900 Blood 30  30 Estimated Blood Loss 30  30 Shift Total(mL/kg) 430(3.4) [id number]) 7717(92.9) NET 1070 -135.4 934.6 Weight (kg) 124.7 124.7 124.7 124.7 124.7 124.7 PE 
Pulm - CTAB 
CV - RRR Abd - soft, ND, BS Present, incisions c/d/i Labs Recent Results (from the past 12 hour(s)) METABOLIC PANEL, BASIC Collection Time: 05/17/19  5:08 AM  
Result Value Ref Range  Sodium 139 136 - 145 mmol/L  
 Potassium 4.2 3.5 - 5.1 mmol/L Chloride 106 97 - 108 mmol/L  
 CO2 24 21 - 32 mmol/L Anion gap 9 5 - 15 mmol/L Glucose 112 (H) 65 - 100 mg/dL BUN 11 6 - 20 MG/DL Creatinine 0.72 0.70 - 1.30 MG/DL  
 BUN/Creatinine ratio 15 12 - 20 GFR est AA >60 >60 ml/min/1.73m2 GFR est non-AA >60 >60 ml/min/1.73m2 Calcium 9.0 8.5 - 10.1 MG/DL  
CBC W/O DIFF Collection Time: 05/17/19  5:08 AM  
Result Value Ref Range WBC 16.4 (H) 4.1 - 11.1 K/uL  
 RBC 4.64 4.10 - 5.70 M/uL  
 HGB 13.9 12.1 - 17.0 g/dL HCT 42.3 36.6 - 50.3 % MCV 91.2 80.0 - 99.0 FL  
 MCH 30.0 26.0 - 34.0 PG  
 MCHC 32.9 30.0 - 36.5 g/dL  
 RDW 12.5 11.5 - 14.5 % PLATELET 964 372 - 453 K/uL MPV 10.5 8.9 - 12.9 FL  
 NRBC 0.0 0  WBC ABSOLUTE NRBC 0.00 0.00 - 0.01 K/uL Assessment Ang Jaeger is a 34 y. o.yr old male s/p sleeve gastrectomy Plan DOing well post op Start liquid diet today Continue IVF 
OOB more today PRN pain meds Possible DC Home later today Livan Carranza MD

## 2019-05-17 NOTE — DISCHARGE INSTRUCTIONS
DISCHARGE SUMMARY from Nurse    PATIENT INSTRUCTIONS:    After general anesthesia or intravenous sedation, for 24 hours or while taking prescription Narcotics:  · Limit your activities  · Do not drive and operate hazardous machinery  · Do not make important personal or business decisions  · Do  not drink alcoholic beverages  · If you have not urinated within 8 hours after discharge, please contact your surgeon on call. Report the following to your surgeon:  · Excessive pain, swelling, redness or odor of or around the surgical area  · Temperature over 100.5  · Nausea and vomiting lasting longer than 4 hours or if unable to take medications  · Any signs of decreased circulation or nerve impairment to extremity: change in color, persistent  numbness, tingling, coldness or increase pain  · Any questions    What to do at Home:  Recommended activity: {discharge activity:06368}, ***    If you experience any of the following symptoms ***, please follow up with ***. *  Please give a list of your current medications to your Primary Care Provider. *  Please update this list whenever your medications are discontinued, doses are      changed, or new medications (including over-the-counter products) are added. *  Please carry medication information at all times in case of emergency situations. These are general instructions for a healthy lifestyle:    No smoking/ No tobacco products/ Avoid exposure to second hand smoke  Surgeon General's Warning:  Quitting smoking now greatly reduces serious risk to your health.     Obesity, smoking, and sedentary lifestyle greatly increases your risk for illness    A healthy diet, regular physical exercise & weight monitoring are important for maintaining a healthy lifestyle    You may be retaining fluid if you have a history of heart failure or if you experience any of the following symptoms:  Weight gain of 3 pounds or more overnight or 5 pounds in a week, increased swelling in our hands or feet or shortness of breath while lying flat in bed. Please call your doctor as soon as you notice any of these symptoms; do not wait until your next office visit. Recognize signs and symptoms of STROKE:    F-face looks uneven    A-arms unable to move or move unevenly    S-speech slurred or non-existent    T-time-call 911 as soon as signs and symptoms begin-DO NOT go       Back to bed or wait to see if you get better-TIME IS BRAIN. Warning Signs of HEART ATTACK     Call 911 if you have these symptoms:   Chest discomfort. Most heart attacks involve discomfort in the center of the chest that lasts more than a few minutes, or that goes away and comes back. It can feel like uncomfortable pressure, squeezing, fullness, or pain.  Discomfort in other areas of the upper body. Symptoms can include pain or discomfort in one or both arms, the back, neck, jaw, or stomach.  Shortness of breath with or without chest discomfort.  Other signs may include breaking out in a cold sweat, nausea, or lightheadedness. Don't wait more than five minutes to call 911 - MINUTES MATTER! Fast action can save your life. Calling 911 is almost always the fastest way to get lifesaving treatment. Emergency Medical Services staff can begin treatment when they arrive -- up to an hour sooner than if someone gets to the hospital by car. The discharge information has been reviewed with the {PATIENT PARENT GUARDIAN:11544}. The {PATIENT PARENT GUARDIAN:35096} verbalized understanding. Discharge medications reviewed with the {Dishcarge meds reviewed NUAQ:91705} and appropriate educational materials and side effects teaching were provided.   ___________________________________________________________________________________________________________________________________Laparoscopic Sleeve Gastrectomy    Patient Discharge Instructions    Love Hart / [id number] : 1980    Admitted 2019 Discharged:        · It is important that you take the medication exactly as they are prescribed. · Keep your medication in the bottles provided by the pharmacist and keep a list of the medication names, dosages, and times to be taken in your wallet. · Do not take other medications without consulting your doctor. · Wound care: You may shower starting tomorrow. Do not remove the dermabond on the incision, it will fall of on its own in a few weeks. · No heavy lifting or strenuous activity for 2wks after the operation    Please add a B12 500mcg supplement daily. Because part of your stomach has been removed, you are more at risk for a B12 deficiency. Diet:  Full liquid Bariatric diet as outlined in your education book and on the handout provided by the dietician. Gastric Sleeve  Patient Discharge Instructions  [address] [facility] Surgery at [facility]   [phone]    1. Activities: You may be active walking, stair climbing, and doing light weight activities with one to two-pound weights, sitting in a chair using different arm motions. Major restrictions include driving until your first office visit and lifting anything heavier than ten pounds. It is very important that you walk frequently. In addition to walking, you should continue to use your incentive spirometer (breathing exercises) throughout the day. 2. Caring for vour incision (s}: Your surgical incision(s) will be covered with Derma bond (super glue). You may shower as desired and simply pat dry the area over the incision(s). There may occasionally be seepage of yellowish to yellowish-maroon dissolved fat from your wound, which is of no major concern as long as the wound is not red, hardened in the area of the drainage, or if the drainage has a foul smell. If there are any questions, call our office for further instructions. If there is this type of dissolved fat drainage,  the shower and gently express the fluid from your wound.  Then keep gauze pads over the area to protect both the wound and your clothes. 3. When to call the office immediately:      *Chest pain (not associated with eating/drinking)  *Shortness of breath (more than normal)  *Sudden pain and/or redness in calf  *Fever greater than 101F  *Persistent nausea and/or vomiting (unable to keep down any liquids)  *Bleeding from incision(s)  *Severe abdominal pain  *Any other concerning symptoms    4. Diet: There are three main priorities as far as your diet is concerned---    a. Clear Liquids-drink from 1 oz. cups or standard shot glass. These liquids are non-carbonated, no added sugar, and not irritating to your stomach. They may include water, tea, coffee\" 100% no added sugar juices (avoid citrus) , clear broth, blenderized clear soups such as globa.ly' Healthy Request Chicken Noodle and Chicken & Rice, Sugar-free products including popsicles, Anton-Aid, and Crystal Lite. b. Vitamins: Multi-vitamin with iron, Vitamin B-12 and D capsule may be taken as recommended. c. Protein Intake: During your initial two-three weeks when your body is switching from burning glucose to directly burning fat, there is an attempt by your body to use protein as a fuel. To protect that protein, we like you to exercise as listed above, and to try to take in 50-60 grams of protein per day. This can be done with four 8 oz. serving of skim milk and Low Carb [place] Instant Breakfast. Each 8 oz. should be considered a meal-breakfast, lunch, or supper, and during this mealtime it should be the sole ingested substance. Stop your clear liquids for 20 minutes before your start on the instant breakfast, which is sipped 1 oz. at a time. You may also try the following substitute for [place] Instant Breakfast: skim milk-fat free powdered milk + Egg Beaters + flavor extract. If desired, ice may be added and blenderized in the . If the milk upsets your stomach, you may purchase Lactaid tablets from any drug store. Lactaid skim milk may be purchased at most major supermarkets. Another alternative is Boost Diabetic, which is Lactose-free and ready to drink. There are many protein supplements on the market. Whey and Soy based protein powders/drinks are acceptable. If you have any questions about specific products please call the office. d. Other foods may be taken if you have met the requirements of a, b, and c. These foods should be low fat, low sugar, and low spice, and blenderized to the point that, if needed, could be sucked through a straw. One of the favorite treats is dietetic canned fruit blenderized with a combination of its juices and crushed ice, and then eaten in small amounts with a spoon. A smooth low-calorie, low-fat yogurt (should be 100 calories or less) is acceptable as is low-fat or fat-free cottage cheese. If you are having difficulty with your diet, please call the office. 5. Medications: Take no more than 2 pills at a time. Wait 20 minutes between pills. a. Vitamins as listed above---multi-vitamin with iron twice a day, B-12 once a day, vitamin D.    b. Acid reducing medicine--Will be prescribed by your surgeon at discharge. c. Mylanta Plus--one to two teaspoons as needed for belching or gas (other gas relieving medicines are also acceptable Beano, GasX, etc). d. Bowel Regulation--mild laxatives are permissible such as Milk of Magnesia, Dulcolax (either by mouth or suppository). If you are accustomed to using a i2dvcxsgvz laxative not mentioned, you may continue to use it. Fibercon tablets or Benefiber may be taken as a fiber supplement to regulate bowel movements. Fibercon tablets should be broken in half and taken in half and taken one half in the morning and one half in the evening. Benefiber (1 tsp.) can be added to your protein drink(s). It has no taste or added thickness.        Imodium AD may be taken as needed for diarrhea.    e. Pain medication-one to two every four hours as needed for pain control. If pain is mild, try extra-strength Tylenol first.    f. Do not take any pain or arthritis medication such as Aspirin, Advil, Aleve, Naprosyn, or any other nonsteroidal anti-inflammatory medication unless approved by your surgeon. A Tylenol product is OK.    g. Preadmission medications may be resumed, but this should be discussed with your doctor before your discharge from the hospital.    6.  Follow up Office Visits:  call our office at [phone] if you have any questions or concerns. Your appointment should be 2 weeks from your surgery date. Do not plan on returning to work prior to this office visit unless discussed with your doctor. Information obtained by :  I understand that if any problems occur once I am at home I am to contact my physician. I understand and acknowledge receipt of the instructions indicated above.                                                                                                                                            Physician's or R.N.'s Signature                                                                  Date/Time                                                                                                                                              Patient or Representative Signature                                                          Date/Time         MD James Acharya, NIKOLAS [facility], [facility] Avani Couch

## 2019-05-20 ENCOUNTER — TELEPHONE (OUTPATIENT)
Dept: SURGERY | Age: 39
End: 2019-05-20

## 2019-05-20 NOTE — TELEPHONE ENCOUNTER
Bariatric Post-Operative Phone Calls: 48 hour phone call    Diet:Question of any nausea and/or vomiting. Protein intake (goal is 60 grams of protein daily)   Poor____Fair____Good____Great_x___     Comment:______________________________________________________________      ______________________________________________________________________    Hydration:Less than 32 ounces of water daily is fair to poor (Goal is 64 ounces per day)   Poor____ Fair____ Good____Great_x___    Comment:______________________________________________________________    ______________________________________________________________________      Ambulation:( walking at least 3 x week, for 15- 20 minutes)     Poor______ Fair______ Good______     Great__x____ Comment:__________________________________________________    ______________________________________________________________________      Urine Color: Question of any odor and color(should be dixon, pale, and clear) Dark______ Amber______ Pale______      Clear___x___ Comment:___________________________________________________                           ________________________________________________________________    Bowel movements: Question of any constipation- haven't had any bowel movements for more than 3 days. This could be related to protein intake and/or narcotic pain medication usage. Comment:                                                                                                               Advised patient to use miralax, milk of magnesia and                Pain: Left sided abdominal pain is normal (should be less than 3)  Question if pain medication is helpful.  10___ 9___ 8___ 7___ 6__x_ 5___ 4___ 3___     2___1___0___Comment:_________________________________________________    ______________________________________________________________________      Incision: (No redness, pain, swelling or fever) Healing Well___x___     Healed______Redness_________ Pain_________     Swelling_________ Fever__________(greater than 101 needs evaluation)    Comment:____________________________________________________________    ______________________________________________________________________  Use of incentive spirometer: Yes__x__       No           Next Appointment:__5/30/19____________                 Support Group: Yes___x___No______    Additional Comments:____________________________________________________________    ____________________________________________________________________      If more than one parameter is not met or considered poor, nurse needs to discuss with provider recommend for patient to be seen in the office as soon as possible or refer to the provider for follow-up. Reinforce to patient to use bariatric educational booklet as guide. It is appropriate to refer patient to the nutritionist to discuss more in detail of diet and nutrition.

## 2019-05-20 NOTE — TELEPHONE ENCOUNTER
----- Message from [facility] sent at 2019  2:59 PM EDT -----  Regardinhr post op call      ----- Message -----  From: Diana Devries NP  Sent: 2019   2:40 PM  To: Sonny Peters,   He is a Sleeve gastrectomy from 2019. Tolerating liquids without issue.  Discharged home POD 1 from Sleeve gastrectomy  Nga

## 2019-05-30 ENCOUNTER — OFFICE VISIT (OUTPATIENT)
Dept: SURGERY | Age: 39
End: 2019-05-30

## 2019-05-30 VITALS
BODY MASS INDEX: 36.94 KG/M2 | RESPIRATION RATE: 16 BRPM | HEIGHT: 70 IN | TEMPERATURE: 98.5 F | SYSTOLIC BLOOD PRESSURE: 131 MMHG | WEIGHT: 258 LBS | OXYGEN SATURATION: 98 % | HEART RATE: 82 BPM | DIASTOLIC BLOOD PRESSURE: 87 MMHG

## 2019-05-30 DIAGNOSIS — Z09 FOLLOW-UP EXAMINATION AFTER ABDOMINAL SURGERY: Primary | ICD-10-CM

## 2019-05-30 NOTE — PROGRESS NOTES
1. Have you been to the ER, urgent care clinic since your last visit? Hospitalized since your last visit? No    2. Have you seen or consulted any other health care providers outside of the [address] [place] [address] since your last visit? Include any pap smears or colon screening.  No

## 2019-05-30 NOTE — PATIENT INSTRUCTIONS
Continue to use your shakes and protein powder to meet your daily protein goals     What you need to know:  1. Advance your diet to soft foods. Follow the handout that you were given today in the office. 2.  Take the recommended vitamins daily  3. No lifting greater than 20 lbs. 4.  You can do light jogging and walking. 5  Follow up in 2 weeks. 6.  You may go into a pool. 7.  If you are not able to tolerate liquids or soft foods. Please call our office. [phone]  8. If you have vomiting and persistent epigastric pain or chest pain. You should call our office, the doctor on-call or go to the emergency room. What to do if you are constipated: You may  take Milk of Magnesia. Take 2 Tablespoons followed by 16 oz of water then 2 hours later take another 2 tablespoons. If  milk of magnesia does not work then take [place] or Miralax over the counter. Keep in mind that the Benefiber or Miralax may take a day or two to work. If all of the above do not work try a Fleets enema and follow the directions on the box. Soft and Mushy: Phase 1    Below is a list of basic items to purchase for the first phase of the   soft mushy diet. Your surgeon or nurse practitioner will inform you when it is okay   to advance to the next phase. Soft and Mushy Foods: Prepare food to the appropriate texture. ? Everything on clear and full liquid diet  ? Applesauce (no sugar added)  ? Hot & cold cereals (Cream of Wheat, Plain Cheerios®, Special K with protein®, plain oatmeal, grits)  ? Frozen or canned vegetables (carrots, acorn squash, butternut squash, string beans, spinach, broccoli, cauliflower - florets only!)   ? Canned fruit (in natural juice or with Splenda®)  ? Fat-free, cholesterol-free egg substitute (P)  ? Low-fat or fat-free cottage cheese (P)  ? Low-fat or fat-free yogurt (P)  ? Low-fat or fat-free [place] yogurt (P)  ? Fat-free milk or 1% milk (P)  ?  Lactaid fat-free or 1% low fat milk (P)  ? Low-fat well-cooked/soft beans (the consistency of refried beans) (P)  ? No sugar added, low fat pudding (no pistachio or other flavor containing nuts)  ? low-fat cream soups  ? Low-fat chicken noodle or chicken rice soup (P)  ? Sugar-free fudgesicles  ? Sugar-free cocoa  ? Fat free whipped or mashed potatoes   ? Herbs and spices  ? Lite butter, margarine, canola oil, olive oil, reduced-fat or fat-free mayonnaise, reduced-fat or fat-free salad dressing, reduced-fat or fat-free cream cheese, reduced-fat or fat-free sour cream.        P designates food sources of protein. Include a protein at each meal.     If a food does not contain protein, you may want to consider adding protein powder to the food to give it extra protein. For example, mix protein powder in with the following: oatmeal, mashed potatoes, sugar-free pudding, sugar-free gelatin (see recipes in book), no-sugar-added applesauce. Soft Mushy Diet: Phase 1  Time Meal or Snack Soft/Mushy Food Amount (ounces) Protein  (g) Supplement   6:30 am Sip on Fluids Sip on non-carbonated, calorie-free, no sugar added liquids. 8 oz   0 g Take Multivitamin containing 18 mg ferrous sulfate (iron)    7:00 am   Stop drinking fluids 30 minutes before breakfast   7:30 am Breakfast ½ cup sugar-free oatmeal with 1 scoop of protein powder. Add cinnamon, nutmeg, artificial sweeteners as desired for flavor. 4 oz    20-25 g    9:00 Snack  (optional) High Protein Gelatin (see recipe in book) 4oz 10 g    11:30 am Stop drinking liquids 30 minutes before lunch   12:00 pm Lunch Sip low-fat cream of potato soup or low-fat cream of chicken soup mixed with 1 scoop of protein powder 8 oz soup 25 g Take 400 mg calcium citrate   2:00 Snack  (optional) ½ cup high protein pudding (see recipe in book)   or   ½ cup low-fat cottage cheese or yogurt. Can also add protein powder as needed.   4 oz 14 g    or    5 g      3:00 - 5:30 pm   Sip on Fluids   Sip on non-carbonated, calorie-free, no sugar added liquids. 24 - 32 oz   0 g   Take 400 mg calcium citrate. 6:00 pm Dinner ¼ cup low-fat, well cooked beans (ex. black beans, low-fat refried beans)  ¼ cup no-sugar-added applesauce 4 oz 3.5 g Take 400 mg of calcium citrate.    7:00 - 10:00 pm Sip on Fluids Sip on non-carbonated, no sugar added liquids as needed  16-24 oz 0 g Take Multivitamin with 18 mg ferrous sulfate   Total:  80 oz clear fluids 63-77  grams 2 Multivitamins with 18 mg ferrous sulfate, 3398-6703 mg calcium citrate

## 2019-05-30 NOTE — PROGRESS NOTES
Chief Complaint   Patient presents with    Surgical Follow-up     2wks s/p sleeve gastrectomy, down 28lbs       Karol Henry is 2 weeks status post Sleeve gastrectomy for treatment of morbid obesity . Presents today for obesity management. Patient has lost 28 lbs. Since surgery. Patient is satisfied with progress. Patient is consuming 60+ grams of protein daily. Using [employer] [facility] [employer]    Patient is drinking 32 oz of fluids per day. Struggling with fluids as he did not drink much pre surgery    Bowels moving most days with senna   Constipated  Yes 1st week home   Using laxatives yes  Nausea  Unless drinks too fast  Regurgitation  no  No fever or chills, chest pain or shortness of breath. Vitamin compliance yes  Activity  walking  Sleep   Ok   Minimal pain right side and with activity   Physical Exam  Visit Vitals  /87 (BP 1 Location: Left arm, BP Patient Position: Sitting)   Pulse 82   Temp 98.5 °F (36.9 °C) (Oral)   Resp 16   Ht 5' 10\" (1.778 m)   Wt 258 lb (117 kg)   SpO2 98%   BMI 37.02 kg/m²     A + O x 3, appears well and here with spouse   Chest  CTA, unlabored   COR  RRR  ABD Soft, obese, lap sites C/D/I, no erythema or induration, minimal tenderness right side as expected /ND, no masses or hernias   EXT No edema; ambulating independently       ICD-10-CM ICD-9-CM    1. Follow-up examination after abdominal surgery Z09 V67.09    2. BMI 37.0-37.9, adult Z68.37 V85.37        Karol Henry is 2 weeks s/p Sleeve gastrectomy for treatment of morbid obesity  doing well   Diet soft stage I   Continue vitamins and protein supplements   Activity daily walking 10 minutes every hour and ok to swim in a pool   Sleep hygiene reviewed   Follow-up in 2 weeks  Return to work not released at this time   Support group  Karol Henry verbalized understanding and questions were answered to the best of my knowledge and ability. Diet, activity and mindfulness educational materials were provided.     15 minutes spent in face to face with Ang Jaeger > 50% counseling.

## 2019-06-06 ENCOUNTER — TELEPHONE (OUTPATIENT)
Dept: SURGERY | Age: 39
End: 2019-06-06

## 2019-06-06 NOTE — TELEPHONE ENCOUNTER
----- Message from Arleen Mays sent at 5/17/2019  2:59 PM EDT -----  Regarding: 3wk post op call      ----- Message -----  From: Marlon Bustillos NP  Sent: 5/17/2019   2:40 PM  To: Tim Olivares,   He is a Sleeve gastrectomy from 5/16/2019. Tolerating liquids without issue.  Discharged home POD 1 from Sleeve gastrectomy  Nga

## 2019-06-06 NOTE — TELEPHONE ENCOUNTER
Bariatric Post-Operative Phone Calls: Week 3    Diet:Question of any nausea and/or vomiting. Question of tolerance to diet advancement from liquids to solids. Protein intake (goal is 60 grams of protein daily)   Poor____Fair____Good____Great__x__     Comment:______________________________________________________________      ______________________________________________________________________    Hydration:Less than 32 ounces of water daily is fair to poor (Goal is 64 ounces per day)   Poor____ Fair____ Good____Great__x__    Comment:______________________________________________________________    ______________________________________________________________________      Ambulation:( walking at least 3 x week, for at least 30 minutes)   Poor______ Fair__x____ Good______     Great______ Comment:__________________________________________________    ______________________________________________________________________      Urine Color: Question of any odor and color(should be dixon, pale, and clear) Dark______ Amber______ Pale______      Clear___x___ Comment:___________________________________________________                           ________________________________________________________________    Bowel movements: Question of any constipation- haven't had any bowel movements for more than 3 days. This could be related to protein intake and/or narcotic pain medication usage. Comment:                                                                                                                    Having issues with bowel movements advised patient to use miralax instead of metamucil to help promote bowel movement           Pain: Left sided abdominal pain is normal (should be less than 3)         Question if pain medication is helpful.  10___ 9___ 8___ 7___ 6___ 5___ 4___ 3___ 2___1___0__x_Comment:_________________________________________________    ______________________________________________________________________      Incision: (No redness, pain, swelling or fever) Healing Well___x___     Healed______Redness_________ Pain_________     Swelling_________ Fever__________(greater than 101 needs evaluation)    Comment:____________________________________________________________    ______________________________________________________________________  Use of incentive spirometer: Yes____       No     x      Next Appointment:__6/13/19____________                 Support Group: Yes___x___No______    Additional Comments:____________________________________________________________    ____________________________________________________________________      If more than one parameter is not met or considered poor, nurse needs to discuss with provider recommend for patient to be seen in the office as soon as possible or refer to the provider for follow-up. Reinforce to patient to use bariatric educational booklet as guide. It is appropriate to refer patient to the nutritionist to discuss more in detail of diet and nutrition.

## 2019-06-07 ENCOUNTER — TELEPHONE (OUTPATIENT)
Dept: SURGERY | Age: 39
End: 2019-06-07

## 2019-06-10 RX ORDER — ONDANSETRON 4 MG/1
4 TABLET, ORALLY DISINTEGRATING ORAL
Qty: 20 TAB | Refills: 0 | Status: SHIPPED | OUTPATIENT
Start: 2019-06-10 | End: 2019-07-02 | Stop reason: SDUPTHER

## 2019-06-17 ENCOUNTER — TELEPHONE (OUTPATIENT)
Dept: SURGERY | Age: 39
End: 2019-06-17

## 2019-06-17 NOTE — TELEPHONE ENCOUNTER
Patient's wife called stating her  is in severe pain and doesn't know if she needs to take him to the ER or not. Please call asap.

## 2019-06-17 NOTE — TELEPHONE ENCOUNTER
Spoke with patient stated the pain he experienced has passed. He laid down for about 10mins and the pain went away. Patient had some issues with constipation yesterday has been taking miralax to promote bowel movement. Advised to use miralax 2-3x daily, try milk of magnesia and gas x to help with bloating. Patient will call the office back if symptoms come back again.

## 2019-06-18 ENCOUNTER — OFFICE VISIT (OUTPATIENT)
Dept: SURGERY | Age: 39
End: 2019-06-18

## 2019-06-18 VITALS
SYSTOLIC BLOOD PRESSURE: 131 MMHG | RESPIRATION RATE: 18 BRPM | BODY MASS INDEX: 35.22 KG/M2 | HEART RATE: 71 BPM | OXYGEN SATURATION: 97 % | HEIGHT: 70 IN | DIASTOLIC BLOOD PRESSURE: 85 MMHG | TEMPERATURE: 98.3 F | WEIGHT: 246 LBS

## 2019-06-18 DIAGNOSIS — Z09 FOLLOW-UP EXAMINATION AFTER ABDOMINAL SURGERY: Primary | ICD-10-CM

## 2019-06-18 DIAGNOSIS — R11.0 NAUSEA: ICD-10-CM

## 2019-06-18 DIAGNOSIS — E66.9 OBESITY, CLASS II, BMI 35-39.9: ICD-10-CM

## 2019-06-18 RX ORDER — OMEPRAZOLE 20 MG/1
20 CAPSULE, DELAYED RELEASE ORAL DAILY
Qty: 30 CAP | Refills: 1 | Status: SHIPPED | OUTPATIENT
Start: 2019-06-18 | End: 2019-06-25 | Stop reason: SDUPTHER

## 2019-06-18 RX ORDER — HYOSCYAMINE SULFATE 0.12 MG/1
0.12 TABLET SUBLINGUAL
Qty: 30 TAB | Refills: 0 | Status: SHIPPED | OUTPATIENT
Start: 2019-06-18

## 2019-06-18 RX ORDER — AMOXICILLIN 250 MG
1 CAPSULE ORAL
Qty: 30 TAB | Refills: 1 | Status: SHIPPED | OUTPATIENT
Start: 2019-06-18

## 2019-06-18 RX ORDER — MULTIVITAMIN WITH IRON
2 TABLET ORAL DAILY
COMMUNITY

## 2019-06-18 NOTE — PROGRESS NOTES
1. Have you been to the ER, urgent care clinic since your last visit? Hospitalized since your last visit? No    2. Have you seen or consulted any other health care providers outside of the [address] [place] [address] since your last visit? Include any pap smears or colon screening.  no

## 2019-06-18 NOTE — PATIENT INSTRUCTIONS
Try taking the multivitamins in the evening     Continue with at least 2 protein shakes per day in addition to food     Ok to add a tbsp of peanut butter to boost calorie intake       What you need to know:  1 . Advance your diet to moist meats. Follow the handout that you were given today in the office. 2. Take the recommended vitamins daily  3 No lifting greater than 40 lbs. 4. You can do light jogging, moderate walking and a recumbent bike. 5 Follow up in 2 weeks. 6. You may go into a pool. 7. If you are not able to tolerate liquids, soft foods or moist meats. Please call our office. [phone]  8. If you have vomiting and persistent epigastric pain or chest pain. You should call our office, the doctor on-call or go to the emergency room. What to do if you are constipated: You may  take Milk of Magnesia. Take 2 Tablespoons followed by 16 oz of water then 2 hours later take another 2 tablespoons. If  milk of magnesia does not work then take [place] or Miralax over the counter. Keep in mind that the Benefiber or Miralax may take a day or two to work. If all of the above do not work try a Fleets enema and follow the directions on the box. Shopping List [employer]     Soft Mushy Diet: Phase 2 - Moist Meats     Below is a list of moist meats that you can now introduce into your diet. Moist Meats: Prepare food to the appropriate texture using low-fat cooking   methods       ? Tuna packed in water (strain before eating)  ? White flaky fish (cherrie, cod, flounder, tilapia, salmon)   ? White chicken breast packed in water (strain before eating)  ? 96-99% fat free thinly sliced deli meat (ham, turkey, roast beef)  ? Fat free non-stick spray  ? Silken Tofu  ? Low-fat or vegetarian refried beans  ? Well-cooked beans and lentils  ? Skinless turkey or chicken (prepare to a soft texture)  ? 93% lean pureed beef (round or sirloin only)  ?  Lean pork (cooked until very tender, cut into small pieces)  ? Eggs (preferable egg whites)  ? Egg substitutes  ? Herbs and spices  ? Lite butter, margarine, canola oil, olive oil, reduced-fat or fat-free posey, reduced-fat or fat-free salad dressing, reduced-fat or fat-free cream cheese, reduced-fat or fat-free sour cream, lemon juice, salt, pepper, mustard, ketchup, salsa. See patient handbook for more low-fat cooking ideas. ? Be sure to use moist methods of cooking. Avoid microwaving meats because it can dry out the food making it harder to tolerate. Soft Mushy Diet: Phase 2  Time Meal or Snack Soft/Mushy Food Amount (ounces) Protein  (g) Supplement   6:30 am Sip on Fluids Sip on non-carbonated, calorie-free, no sugar added liquids. 8 oz   0 g Take Multivitamin containing 18 mg ferrous sulfate (iron)    7:00 am   Stop drinking fluids 30 minutes before breakfast   7:30 am Breakfast ¼ cup soft scrambled egg or egg substitute   ¼ cup canned fruit (packed in natural juice, strained)  4 oz    7 g    9:00 Snack  (optional) ½ cup low-fat or fat-free yogurt   or   ½ cup cottage cheese  4oz 4g  or  14 g    11:30 am Stop drinking liquids 30 minutes before lunch   12:00 pm Lunch ¼ cup low-fat or lean deli meat  with  ¼ well cooked green beans 4 oz  14 g Take 400 mg calcium citrate   2:00 Snack  (optional) ½ cup high protein, sugar-free pudding with 1 scoop added protein powder (see recipe in book). 4 oz 14 g      3:00 - 5:30 pm   Sip on Fluids   Sip on non-carbonated, calorie-free, no sugar added liquids. 24 - 32 oz   0 g   Take 400 mg calcium citrate. 6:00 pm Dinner ¼ cup soft/flaky fish (tilapia, flounder, tuna)  ¼ cup mashed potatoes or pureed cauliflower mashed potatoes (consider adding protein powder)  4 oz 14 g Take 400 mg of calcium citrate.    7:00 - 10:00 pm Sip on Fluids Sip on non-carbonated, no sugar added liquids as needed  16-24 oz 0 g Take Multivitamin with 18 mg ferrous sulfate   Total:  64 oz fluids 63  grams 2 Multivitamins with 18 mg ferrous sulfate, 0287-1683 mg calcium citrate

## 2019-06-18 NOTE — PROGRESS NOTES
Chief Complaint   Patient presents with    Surgical Follow-up     4 weeks s/p lap sleeve gastrectomy down 40 pounds lost 12.       Ciro Flores is 4 weeks status post Sleeve gastrectomy for treatment of morbid obesity . Presents today for obesity management. Patient has lost 40 lbs. Since surgery. He is feeling tired and wiped out most of the time. Nausea and dry heaves associated with the opurity multivitamin tabs   Does ok with the calcium   Goes walking and \"feels completely drained\"     Patient is consuming about 55  grams of protein daily. He continues to use 2 Boost  glucose control shakes per day   Yesterday am after drinking a shake her had \"terrible\" epigastric pain and thought he needed to come to the ER. The pain lasted about 15 minutes and resolved. He had associated nausea and belching  Feels ok today     Patient is drinking > 60  oz of fluids per day. Bowels moving every day to every other day with the pericolace     Regurgitation  Some belching no emesis   No fever or chills, chest pain or shortness of breath. Vitamin compliance Yes opurity supplements   Activity  Walking   Sleep   Poor and cant fall asleep   Physical Exam  Visit Vitals  /85   Pulse 71   Temp 98.3 °F (36.8 °C) (Oral)   Resp 18   Ht 5' 10\" (1.778 m)   Wt 246 lb (111.6 kg)   SpO2 97%   BMI 35.30 kg/m²     A + O x 3, here with wife and son, appears well and NAD   Chest  CTA, unlabored   COR  RRR  ABD Soft, obese, lap sites C/D/I well healed; NT/ND, no masses or hernias   EXT No edema; ambulating independently       ICD-10-CM ICD-9-CM    1. Follow-up examination after abdominal surgery Z09 V67.09    2. Obesity, Class II, BMI 35-39.9 E66.9 278.00    3. BMI 35.0-35.9,adult Z68.35 V85.35    4.  Nausea R11.0 787.02        Ciro Flores is 4 weeks  s/p Sleeve gastrectomy for treatment of morbid obesity   Low energy due to calorie deficit    Diet soft stage II   Continue with at least 2 shakes per day + food   Add some carbohydrates, like soft fruit and ok to add Tbs peanut butter to boost calories   Continue vitamins and protein supplements try taking the MVI at bedtime   If no improvement in nausea can switch to MVI complete with iron chewable or tab, but has to add a B12 500 mcg per day   Renewed Levsin as suspect the pain he experienced was esophageal spasm caused from drinking too fast   Continue omeprazole 20 mg every day   Activity daily walking 10 minutes every hour   Sleep hygiene reviewed add melatonin 3-5 mg qhs for sleep    Follow-up in 2 weeks  Return to work will keep out another 2 weeks due to nausea, epigastric pain, sleep deprivation and fatigue    Support group  Daphne Whitfield verbalized understanding and questions were answered to the best of my knowledge and ability. Diet, activity and mindfulness educational materials were provided. 22 minutes spent in face to face with Daphne Whitfield > 50% counseling.

## 2019-06-25 DIAGNOSIS — K21.9 GASTROESOPHAGEAL REFLUX DISEASE, ESOPHAGITIS PRESENCE NOT SPECIFIED: Primary | ICD-10-CM

## 2019-06-25 RX ORDER — OMEPRAZOLE 20 MG/1
20 CAPSULE, DELAYED RELEASE ORAL DAILY
Qty: 90 CAP | Refills: 2 | Status: SHIPPED | OUTPATIENT
Start: 2019-06-25

## 2019-07-02 ENCOUNTER — OFFICE VISIT (OUTPATIENT)
Dept: SURGERY | Age: 39
End: 2019-07-02

## 2019-07-02 VITALS
HEIGHT: 70 IN | OXYGEN SATURATION: 95 % | HEART RATE: 83 BPM | SYSTOLIC BLOOD PRESSURE: 105 MMHG | TEMPERATURE: 98.5 F | RESPIRATION RATE: 18 BRPM | BODY MASS INDEX: 34.36 KG/M2 | WEIGHT: 240 LBS | DIASTOLIC BLOOD PRESSURE: 69 MMHG

## 2019-07-02 DIAGNOSIS — R11.0 NAUSEA: ICD-10-CM

## 2019-07-02 DIAGNOSIS — Z09 SURGICAL FOLLOWUP: ICD-10-CM

## 2019-07-02 DIAGNOSIS — E66.9 OBESITY (BMI 30-39.9): Primary | ICD-10-CM

## 2019-07-02 RX ORDER — SCOLOPAMINE TRANSDERMAL SYSTEM 1 MG/1
1 PATCH, EXTENDED RELEASE TRANSDERMAL
Qty: 5 PATCH | Refills: 1 | Status: SHIPPED | OUTPATIENT
Start: 2019-07-02

## 2019-07-02 RX ORDER — ONDANSETRON 4 MG/1
4 TABLET, ORALLY DISINTEGRATING ORAL
Qty: 30 TAB | Refills: 0 | Status: SHIPPED | OUTPATIENT
Start: 2019-07-02

## 2019-07-02 NOTE — PATIENT INSTRUCTIONS
Eating Healthy Foods: Care Instructions Your Care Instructions Eating healthy foods can help lower your risk for disease. Healthy food gives you energy and keeps your heart strong, your brain active, your muscles working, and your bones strong. A healthy diet includes a variety of foods from the basic food groups: grains, vegetables, fruits, milk and milk products, and meat and beans. Some people may eat more of their favorite foods from only one food group and, as a result, miss getting the nutrients they need. So, it is important to pay attention not only to what you eat but also to what you are missing from your diet. You can eat a healthy, balanced diet by making a few small changes. Follow-up care is a key part of your treatment and safety. Be sure to make and go to all appointments, and call your doctor if you are having problems. It's also a good idea to know your test results and keep a list of the medicines you take. How can you care for yourself at home? Look at what you eat · Keep a food diary for a week or two and record everything you eat or drink. Track the number of servings you eat from each food group. · For a balanced diet every day, eat a variety of: 
? 6 or more ounce-equivalents of grains, such as cereals, breads, crackers, rice, or pasta, every day. An ounce-equivalent is 1 slice of bread, 1 cup of ready-to-eat cereal, or ½ cup of cooked rice, cooked pasta, or cooked cereal. 
? 2½ cups of vegetables, especially: § Dark-green vegetables such as broccoli and spinach. § Orange vegetables such as carrots and sweet potatoes. § Dry beans (such as swain and kidney beans) and peas (such as lentils). ? 2 cups of fresh, frozen, or canned fruit. A small apple or 1 banana or orange equals 1 cup. ? 3 cups of nonfat or low-fat milk, yogurt, or other milk products.  
? 5½ ounces of meat and beans, such as chicken, fish, lean meat, beans, nuts, and seeds. One egg, 1 tablespoon of peanut butter, ½ ounce nuts or seeds, or ¼ cup of cooked beans equals 1 ounce of meat. · Learn how to read food labels for serving sizes and ingredients. Fast-food and convenience-food meals often contain few or no fruits or vegetables. Make sure you eat some fruits and vegetables to make the meal more nutritious. · Look at your food diary. For each food group, add up what you have eaten and then divide the total by the number of days. This will give you an idea of how much you are eating from each food group. See if you can find some ways to change your diet to make it more healthy. Start small · Do not try to make dramatic changes to your diet all at once. You might feel that you are missing out on your favorite foods and then be more likely to fail. · Start slowly, and gradually change your habits. Try some of the following: ? Use whole wheat bread instead of white bread. ? Use nonfat or low-fat milk instead of whole milk. ? Eat brown rice instead of white rice, and eat whole wheat pasta instead of white-flour pasta. ? Try low-fat cheeses and low-fat yogurt. ? Add more fruits and vegetables to meals and have them for snacks. ? Add lettuce, tomato, cucumber, and onion to sandwiches. ? Add fruit to yogurt and cereal. 
Enjoy food · You can still eat your favorite foods. You just may need to eat less of them. If your favorite foods are high in fat, salt, and sugar, limit how often you eat them, but do not cut them out entirely. · Eat a wide variety of foods. Make healthy choices when eating out · The type of restaurant you choose can help you make healthy choices. Even fast-food chains are now offering more low-fat or healthier choices on the menu. · Choose smaller portions, or take half of your meal home. · When eating out, try: ? A veggie pizza with a whole wheat crust or grilled chicken (instead of sausage or pepperoni). ? Pasta with roasted vegetables, grilled chicken, or marinara sauce instead of cream sauce. ? A vegetable wrap or grilled chicken wrap. ? Broiled or poached food instead of fried or breaded items. Make healthy choices easy · Buy packaged, prewashed, ready-to-eat fresh vegetables and fruits, such as baby carrots, salad mixes, and chopped or shredded broccoli and cauliflower. · Buy packaged, presliced fruits, such as melon or pineapple. · Choose 100% fruit or vegetable juice instead of soda. Limit juice intake to 4 to 6 oz (½ to ¾ cup) a day. · Blend low-fat yogurt, fruit juice, and canned or frozen fruit to make a smoothie for breakfast or a snack. Where can you learn more? Go to http://ramonSproutlingdamari.info/. Enter T756 in the search box to learn more about \"Eating Healthy Foods: Care Instructions. \" Current as of: March 28, 2018 Content Version: 11.9 © 6742-7493 [employer]. Care instructions adapted under license by [employer] (which disclaims liability or warranty for this information). If you have questions about a medical condition or this instruction, always ask your healthcare professional. [place] [address] any warranty or liability for your use of this information. Start Miralax once or twice a day. Continue Colace. Take Zofran before long car rides. Start Scopolamine patch. Scopolamine (Transderm Scop) - (Absorbed through the skin) Why this medicine is used:  
Treat nausea and vomiting. Contact a nurse or doctor right away if you have: · Seeing, hearing, or feeling things that are not there · Confusion or memory loss · Lightheadedness, dizziness, drowsiness, or fainting · Blurred vision · Trouble urinating Common side effects: 
· Skin rash or redness · Restlessness or tiredness · Dry mouth © 2017 [facility] Information is for End User's use only and may not be sold, redistributed or otherwise used for commercial purposes.

## 2019-07-02 NOTE — PROGRESS NOTES
1. Have you been to the ER, urgent care clinic since your last visit? Hospitalized since your last visit? No    2. Have you seen or consulted any other health care providers outside of the [address] [place] [address] since your last visit? Include any pap smears or colon screening.  no

## 2019-07-02 NOTE — PROGRESS NOTES
6 weeks status post Sleeve gastrectomy   Pt reports doing well on liquids, soft foods and soft meats. .    Patient no complaints of pain. No vomiting. He his having increase nausea with his MVI and during car rides that last longer than an hour. He is concerned because he has to drive 2 hour to and from work and he cannot do this nauseated. He is also only sleeping 2 hours a night. He has tried Melatonin which has not helped. Heis drinking approximately 64 oz of water daily. He is drinking and eating 50 + grams of protein daily.   +Bm, constipation. Taking MOM, Metamucil, Miralax, fiber pills, colace. He is taking all of his vitamins but nauseated with MVI    Total weight loss since surgery 46lbs  Weight loss since last visit 6lbs    Visit Vitals  /69   Pulse 83   Temp 98.5 °F (36.9 °C) (Oral)   Resp 18   Ht 5' 10\" (1.778 m)   Wt 240 lb (108.9 kg)   SpO2 95%   BMI 34.44 kg/m²        Patient has an advanced directive:  Not on file. Mr. Karen Ribeiro has a reminder for a \"due or due soon\" health maintenance. I have asked that he contact his primary care provider for follow-up on this health maintenance. Physical Examination: General appearance - alert, well appearing, and in no distress,  Chest - clear to auscultation bilaterally  Heart - normal rate, regular rhythm, normal S1, S2, no murmurs, rubs, clicks or gallops  Abdomen - soft, nontender, nondistended  scars from previous incisions healing without erythema or induration    A/P    Doing well 6 weeks  Status post laparoscopic Sleeve gastrectomy  Diet advanced to solid foods. Focus on 50-60 grams of protein daily. Supplement with unflavored protein powder daily. Encouraged water intake to at least 64 oz of non-carbonated/no calorie beverages. Switch MVI to Adam's MVI with Fe to see if this helps with tolerance. Follow up with PCP to discuss medications for sleep. Start Scopolamine patch to help with motion sickeness.    REfilled Zofran. Continue PPI  Only Continue Miralax and colace for constipation. All of these fiber supplement might be contributing to nausea? Will delay RTW for 2 more weeks due to nausea and sleep deprivation and the length of time he drives. Follow up in 2 weeks. Pt verbalized understanding and questions were answered to the best of my knowledge and ability.         Delbert Gosselin, NINA

## 2019-07-05 ENCOUNTER — TELEPHONE (OUTPATIENT)
Dept: SURGERY | Age: 39
End: 2019-07-05

## 2019-07-05 ENCOUNTER — DOCUMENTATION ONLY (OUTPATIENT)
Dept: SURGERY | Age: 39
End: 2019-07-05

## 2019-07-05 NOTE — TELEPHONE ENCOUNTER
RUSSELL verified. Spoke to patient and made him aware that per Lj Nunez, NP has prescribed Phenergan 25 mg q8h #20, and that I have called [facility] and spoke to [place] and given them rx and instructions. Patient expressed thanks for the assistance and agreement with the above.

## 2019-07-05 NOTE — TELEPHONE ENCOUNTER
Patient was seen in the office earlier in the week and was given a prescription for nausea and stated when he went to pick it up the cost was $100. He stated he would like to know if their is an alternative.

## 2019-07-18 ENCOUNTER — OFFICE VISIT (OUTPATIENT)
Dept: SURGERY | Age: 39
End: 2019-07-18

## 2019-07-18 VITALS
DIASTOLIC BLOOD PRESSURE: 71 MMHG | TEMPERATURE: 98.7 F | WEIGHT: 234.8 LBS | RESPIRATION RATE: 18 BRPM | HEART RATE: 73 BPM | BODY MASS INDEX: 33.61 KG/M2 | SYSTOLIC BLOOD PRESSURE: 102 MMHG | HEIGHT: 70 IN | OXYGEN SATURATION: 97 %

## 2019-07-18 DIAGNOSIS — E66.9 OBESITY (BMI 30.0-34.9): ICD-10-CM

## 2019-07-18 DIAGNOSIS — F51.01 PRIMARY INSOMNIA: ICD-10-CM

## 2019-07-18 DIAGNOSIS — K59.01 SLOW TRANSIT CONSTIPATION: ICD-10-CM

## 2019-07-18 DIAGNOSIS — Z09 FOLLOW-UP EXAMINATION AFTER ABDOMINAL SURGERY: Primary | ICD-10-CM

## 2019-07-18 RX ORDER — TRAZODONE HYDROCHLORIDE 50 MG/1
25-50 TABLET ORAL
Qty: 30 TAB | Refills: 0 | Status: SHIPPED | OUTPATIENT
Start: 2019-07-18 | End: 2019-08-14 | Stop reason: SDUPTHER

## 2019-07-18 NOTE — PROGRESS NOTES
1. Have you been to the ER, urgent care clinic since your last visit? Hospitalized since your last visit? No    2. Have you seen or consulted any other health care providers outside of the [address] [place] [address] since your last visit? Include any pap smears or colon screening.  no

## 2019-07-18 NOTE — LETTER
NOTIFICATION OF RETURN TO WORK / SCHOOL 
 
7/18/2019 1:12 PM 
 
Mr. Alvarez Saeed [address] [id number] [place] To Whom It May Concern: 
 
Alvarez Saeed was under the care of Michele Antony from 5/16/19 to present. He will be able to return to work/school on 7/25/19 with regular duties and/or activities . If there are questions or concerns please have the patient contact our office.  
 
Sincerely, 
 
 
Armida Bui NP

## 2019-07-18 NOTE — PATIENT INSTRUCTIONS
For sleep start taking the Trazodone 1/2 tablet at bedtime. Take the 1/2 tab to start for a week or so, but you can increase to a whole tab if needed     Ok to continue to take Tylenol arthritis 2 tabs as directed for hip pain     Use Dulcolx, senna or any OTC laxative that is effective to keep your bowels moving      Trazodone (Desyrel, Desyrel Dividose, Oleptro, Trazamine) - (By mouth)   Why this medicine is used:   Treats depression and sleep disorder  Contact a nurse or doctor right away if you have:  · Fast, pounding, or uneven heartbeat; lightheadedness or fainting  · Thoughts of hurting yourself or others, unusual behavior  · Anxiety, restlessness, muscle spasms, fever, sweating, vomiting, diarrhea  · Seeing or hearing things that are not there  · Painful, prolonged erection of your penis (men)     Common side effects:  · Blurred vision  · Headache, dizziness, drowsiness  · Constipation, nausea, dry mouth  © 2017 [facility] Information is for End User's use only and may not be sold, redistributed or otherwise used for commercial purposes.

## 2019-07-19 ENCOUNTER — TELEPHONE (OUTPATIENT)
Dept: SURGERY | Age: 39
End: 2019-07-19

## 2019-07-19 NOTE — TELEPHONE ENCOUNTER
Spoke with patient advised that Medical records were sent yesterday and to contact [facility] with further questions

## 2019-07-26 ENCOUNTER — TELEPHONE (OUTPATIENT)
Dept: SURGERY | Age: 39
End: 2019-07-26

## 2019-07-26 NOTE — TELEPHONE ENCOUNTER
Patient identified with two patient identifiers. Patient informed office note completed and faxed to [facility] confirmation received.

## 2019-08-14 DIAGNOSIS — F51.01 PRIMARY INSOMNIA: ICD-10-CM

## 2019-08-15 RX ORDER — TRAZODONE HYDROCHLORIDE 50 MG/1
TABLET ORAL
Qty: 30 TAB | Refills: 0 | Status: SHIPPED | OUTPATIENT
Start: 2019-08-15 | End: 2019-09-12 | Stop reason: SDUPTHER

## 2019-09-12 DIAGNOSIS — F51.01 PRIMARY INSOMNIA: ICD-10-CM

## 2019-09-13 ENCOUNTER — TELEPHONE (OUTPATIENT)
Dept: SURGERY | Age: 39
End: 2019-09-13

## 2019-09-13 RX ORDER — TRAZODONE HYDROCHLORIDE 50 MG/1
TABLET ORAL
Qty: 30 TAB | Refills: 0 | Status: SHIPPED | OUTPATIENT
Start: 2019-09-13 | End: 2019-10-13 | Stop reason: SDUPTHER

## 2019-09-23 ENCOUNTER — TELEPHONE (OUTPATIENT)
Dept: SURGERY | Age: 39
End: 2019-09-23

## 2019-10-13 DIAGNOSIS — F51.01 PRIMARY INSOMNIA: ICD-10-CM

## 2019-10-14 RX ORDER — TRAZODONE HYDROCHLORIDE 50 MG/1
TABLET ORAL
Qty: 30 TAB | Refills: 0 | Status: SHIPPED | OUTPATIENT
Start: 2019-10-14

## 2020-02-24 ENCOUNTER — OFFICE VISIT (OUTPATIENT)
Dept: SURGERY | Age: 40
End: 2020-02-24

## 2020-02-24 VITALS
BODY MASS INDEX: 30.35 KG/M2 | TEMPERATURE: 98.3 F | HEART RATE: 81 BPM | WEIGHT: 212 LBS | HEIGHT: 70 IN | DIASTOLIC BLOOD PRESSURE: 73 MMHG | SYSTOLIC BLOOD PRESSURE: 108 MMHG | OXYGEN SATURATION: 98 % | RESPIRATION RATE: 18 BRPM

## 2020-02-24 DIAGNOSIS — Z09 FOLLOW-UP EXAMINATION AFTER ABDOMINAL SURGERY: ICD-10-CM

## 2020-02-24 DIAGNOSIS — E66.01 MORBID OBESITY (HCC): Primary | ICD-10-CM

## 2020-02-24 DIAGNOSIS — Z90.3 HISTORY OF SLEEVE GASTRECTOMY: ICD-10-CM

## 2020-02-24 NOTE — PROGRESS NOTES
1. Have you been to the ER, urgent care clinic since your last visit? Hospitalized since your last visit? No    2. Have you seen or consulted any other health care providers outside of the [address] [place] [address] since your last visit? Include any pap smears or colon screening.  No

## 2020-02-24 NOTE — PROGRESS NOTES
[facility] at [facility] Surgery History and Physical    History of Present Illness:      Leanne Singh is a 37 y.o. male who is status post sleeve gastrectomy 9 months ago. He is doing very well and not having any issues with acid reflux abdominal pain nausea vomiting. He has been eating small portions about 5 times a day. He is eating tunafish small sandwich cheese and other reasonable things. His portion size appears to be very appropriate but does not have any acute issues currently. Past Medical History:   Diagnosis Date    Arthritis     Chronic pain     RIGHT HIP    Depression     GERD (gastroesophageal reflux disease)     Hypertension     borderline/no lnger on any medications (4-22-19)    Migraine     Morbid obesity (HCC)     DIETER (obstructive sleep apnea)     mild     Other ill-defined conditions(799.89)     Kidney stones 2018       Past Surgical History:   Procedure Laterality Date    HX APPENDECTOMY      HX CARPAL TUNNEL RELEASE Right     HX GASTRECTOMY  05/16/2019    HX HEENT      Tonsilectomy    HX HEENT      MARY. LASIK    HX ORTHOPAEDIC      right hip surgery x4     HX ORTHOPAEDIC Right     CARPAL TUNNEL         Current Outpatient Medications:     traZODone (DESYREL) 50 mg tablet, TAKE 1/2 TO 1 TABLET BY MOUTH NIGHTLY FOR INSOMNIA, Disp: 30 Tab, Rfl: 0    ondansetron (ZOFRAN ODT) 4 mg disintegrating tablet, Take 1 Tab by mouth every eight (8) hours as needed for Nausea (AFTER SURGERY). , Disp: 30 Tab, Rfl: 0    scopolamine (TRANSDERM-SCOP) 1 mg over 3 days pt3d, 1 Patch by TransDERmal route every seventy-two (72) hours. , Disp: 5 Patch, Rfl: 1    omeprazole (PRILOSEC) 20 mg capsule, Take 1 Cap by mouth daily. Indications: gastroesophageal reflux disease, Disp: 90 Cap, Rfl: 2    multivitamin with iron tablet, Take 2 Tabs by mouth daily.  Indications: Treatment To Prevent Vitamin Deficiency, opurity bariatric vitamins, Disp: , Rfl:    calcium-cholecalciferol, d3, (CALCIUM 600 + D) 600-125 mg-unit tab, Take 2 Tabs by mouth daily. Indications: opurity bariatric vitamins, Disp: , Rfl:     hyoscyamine SL (LEVSIN/SL) 0.125 mg SL tablet, 1 Tab by SubLINGual route every four (4) hours as needed for Cramping (CHEST PRESSURE AND PAIN AFTER SURGERY). , Disp: 30 Tab, Rfl: 0    senna-docusate (PERICOLACE) 8.6-50 mg per tablet, Take 1 Tab by mouth daily as needed for Constipation. Indications: constipation, Disp: 30 Tab, Rfl: 1    butalbital-acetaminophen-caffeine (FIORICET, ESGIC) -40 mg per tablet, Take 2 Tabs by mouth every six (6) hours as needed for Headache.  Max Daily Amount: 6 Tabs., Disp: 30 Tab, Rfl: 0    prochlorperazine (COMPAZINE) 5 mg tablet, 1-2 tabs po every 6 hours prn nausea or headache, Disp: 40 Tab, Rfl: 0    Allergies   Allergen Reactions    Bee Venom Protein (Honey Bee) Swelling    Hydrocodone Anxiety    Oxycodone Anxiety       Social History     Socioeconomic History    Marital status:      Spouse name: Not on file    Number of children: 3    Years of education: Not on file    Highest education level: Not on file   Occupational History    Occupation: superviser      Employer: [employer]   Social Needs    Financial resource strain: Not on file    Food insecurity:     Worry: Not on file     Inability: Not on file    Transportation needs:     Medical: Not on file     Non-medical: Not on file   Tobacco Use    Smoking status: Former Smoker     Packs/day: 0.25     Years: 20.00     Pack years: 5.00     Last attempt to quit: 2013     Years since quittin.5    Smokeless tobacco: Never Used    Tobacco comment: vaps    Substance and Sexual Activity    Alcohol use: No    Drug use: No    Sexual activity: Yes     Partners: Female   Lifestyle    Physical activity:     Days per week: Not on file     Minutes per session: Not on file    Stress: Not on file   Relationships    Social connections:     Talks on phone: Not on file     Gets together: Not on file     Attends [employer] service: Not on file     Active member of club or organization: Not on file     Attends meetings of clubs or organizations: Not on file     Relationship status: Not on file    Intimate partner violence:     Fear of current or ex partner: Not on file     Emotionally abused: Not on file     Physically abused: Not on file     Forced sexual activity: Not on file   Other Topics Concern    Not on file   Social History Narrative    In the home with spouse and 15year old (2 older children out of the house)        Family History   Problem Relation Age of Onset    Heart Disease Father     Diabetes Father     Arthritis-osteo Brother     Dementia Maternal Grandmother     Arthritis-osteo Brother     Anesth Problems Neg Hx        ROS   Constitutional: negative  Ears, Nose, Mouth, Throat, and Face: negative  Respiratory: negative  Cardiovascular: negative  Gastrointestinal: negative  Genitourinary:negative  Integument/Breast: negative  Hematologic/Lymphatic: negative  Behavioral/Psychiatric: negative  Allergic/Immunologic: negative      Physical Exam:     Visit Vitals  /73 (BP 1 Location: Left arm, BP Patient Position: Sitting)   Pulse 81   Temp 98.3 °F (36.8 °C) (Oral)   Resp 18   Ht 5' 10\" (1.778 m)   Wt 212 lb (96.2 kg)   SpO2 98%   BMI 30.42 kg/m²       General - alert and oriented, no apparent distress  HEENT - no jaundice, no hearing imparement  Pulm - CTAB, no C/W/R  CV - RRR, no M/R/G  Abd - soft, ND, BS present, NTTP  Ext - pulses intact in UE and LE bilaterally, no edema  Skin - supple, no rashes  Psychiatric - normal affect, good mood    Labs  none    Imaging  none  I have reviewed and agree with all of the pertinent images    Assessment:     Carloz Wooten is a 37 y.o. male Sleeve gastrectomy    Recommendations:     1. He is doing very well after surgery and has no acute issues he is lost 80 pounds since his surgery.   He will follow-up in about 3 months for another weight check at 1 year and then will see him yearly after that. He does not seem to need nutritional labs he sounds like he is eating and drinking very well he is doing his vitamins daily routinely. MD Luisana TabaresBolivar Schulz has a reminder for a \"due or due soon\" health maintenance. I have asked that he contact his primary care provider for follow-up on this health maintenance.

## 2020-05-20 ENCOUNTER — TELEPHONE (OUTPATIENT)
Dept: SURGERY | Age: 40
End: 2020-05-20

## 2020-05-26 ENCOUNTER — TELEPHONE (OUTPATIENT)
Dept: SURGERY | Age: 40
End: 2020-05-26

## 2021-03-17 ENCOUNTER — TELEPHONE (OUTPATIENT)
Dept: SURGERY | Age: 41
End: 2021-03-17

## 2022-03-14 ENCOUNTER — TELEPHONE (OUTPATIENT)
Dept: SURGERY | Age: 42
End: 2022-03-14

## (undated) DEVICE — POWER SHELL: Brand: SIGNIA

## (undated) DEVICE — STRAP POS KNEE BODY VELC

## (undated) DEVICE — NEEDLE HYPO 22GA L1.5IN BLK S STL HUB POLYPR SHLD REG BVL

## (undated) DEVICE — VISUALIZATION SYSTEM: Brand: CLEARIFY

## (undated) DEVICE — VISIGI 3D®  CALIBRATION SYSTEM  SIZE 36FR STD W/ BULB: Brand: BOEHRINGER® VISIGI 3D™ SLEEVE GASTRECTOMY CALIBRATION SYSTEM, SIZE 36FR W/BULB

## (undated) DEVICE — STAPLER 60: Brand: SUREFORM

## (undated) DEVICE — SOLUTION IV 1000ML 0.9% SOD CHL

## (undated) DEVICE — 3000CC GUARDIAN II: Brand: GUARDIAN

## (undated) DEVICE — SUTURE VCRL SZ 2-0 L27IN ABSRB VLT RB-1 L17MM 1/2 CIR J306H

## (undated) DEVICE — SUTURE SZ 0 27IN 5/8 CIR UR-6  TAPER PT VIOLET ABSRB VICRYL J603H

## (undated) DEVICE — STERILE POLYISOPRENE POWDER-FREE SURGICAL GLOVES: Brand: PROTEXIS

## (undated) DEVICE — APPLICATOR BNDG 1MM ADH PREMIERPRO EXOFIN

## (undated) DEVICE — INSUFFLATION NEEDLE: Brand: SURGINEEDLE

## (undated) DEVICE — VESSEL SEALER: Brand: ENDOWRIST;DAVINCI SI

## (undated) DEVICE — SUTURE DEV SZ 0 L6IN N ABSORBABLE

## (undated) DEVICE — STAPLER 60 RELOAD GREEN: Brand: SUREFORM

## (undated) DEVICE — CLICKLINE SCISSORS INSERT: Brand: CLICKLINE

## (undated) DEVICE — TUBING INSUFLTN 10FT LUER -- CONVERT TO ITEM 368568

## (undated) DEVICE — TIP COVER ACCESSORY

## (undated) DEVICE — BLADELESS TROCAR WITH FIXATION CANNULA: Brand: VERSAPORT PLUS

## (undated) DEVICE — LARGE SUTURE CUT NEEDLE DRIVER: Brand: ENDOWRIST

## (undated) DEVICE — STERILE POLYISOPRENE POWDER-FREE SURGICAL GLOVES WITH EMOLLIENT COATING: Brand: PROTEXIS

## (undated) DEVICE — KENDALL SCD EXPRESS SLEEVES, KNEE LENGTH, MEDIUM: Brand: KENDALL SCD

## (undated) DEVICE — Device

## (undated) DEVICE — BLADELESS OPTICAL TROCAR WITH FIXATION CANNULA: Brand: VERSAONE

## (undated) DEVICE — PACK,BASIC,SIRUS,V: Brand: MEDLINE

## (undated) DEVICE — CLIP APPLIER WITH CLIP LOGIC TECHNOLOGY: Brand: ENDO CLIP III

## (undated) DEVICE — REM POLYHESIVE ADULT PATIENT RETURN ELECTRODE: Brand: VALLEYLAB

## (undated) DEVICE — INFECTION CONTROL KIT SYS

## (undated) DEVICE — (D)PREP SKN CHLRAPRP APPL 26ML -- CONVERT TO ITEM 371833

## (undated) DEVICE — MEDI-VAC NON-CONDUCTIVE SUCTION TUBING: Brand: CARDINAL HEALTH

## (undated) DEVICE — 39" SINGLE PATIENT USE HOVERMATT BREATHABLE: Brand: SINGLE PATIENT USE HOVERMATT

## (undated) DEVICE — Z INACTIVE USE 2240337 DRAPE SURG PT TRANSFER TRAWAY SHT

## (undated) DEVICE — 1200 GUARD II KIT W/5MM TUBE W/O VAC TUBE: Brand: GUARDIAN

## (undated) DEVICE — CADIERE FORCEPS: Brand: ENDOWRIST

## (undated) DEVICE — ENDO CARRY-ON PROCEDURE KIT INCLUDES ENZYMATIC SPONGE, GAUZE, BIOHAZARD LABEL, TRAY, LUBRICANT, DIRTY SCOPE LABEL, WATER LABEL, TRAY, DRAWSTRING PAD, AND DEFENDO 4-PIECE KIT.: Brand: ENDO CARRY-ON PROCEDURE KIT

## (undated) DEVICE — FILTER SMK EVAC FLO CLR MEGADYNE

## (undated) DEVICE — STAPLER 60 RELOAD BLACK: Brand: SUREFORM

## (undated) DEVICE — SUTURE MCRYL SZ 4-0 L27IN ABSRB UD L19MM PS-2 1/2 CIR PRIM Y426H

## (undated) DEVICE — DRAPE KIT ACCS 4-ARM DISP -- DA VINCI

## (undated) DEVICE — SURGICAL PROCEDURE KIT GEN LAPAROSCOPY LF

## (undated) DEVICE — TROCAR SITE CLOSURE DEVICE: Brand: ENDO CLOSE

## (undated) DEVICE — ELECTRO LUBE IS A SINGLE PATIENT USE DEVICE THAT IS INTENDED TO BE USED ON ELECTROSURGICAL ELECTRODES TO REDUCE STICKING.: Brand: KEY SURGICAL ELECTRO LUBE

## (undated) DEVICE — BLADELESS OBTURATOR: Brand: WECK VISTA

## (undated) DEVICE — SUTURE V-LOC 180 SZ 3-0 L6IN ABSRB VLT CV-23 L17MM 1/2 CIR VLOCM0804

## (undated) DEVICE — DRAPE,REIN 53X77,STERILE: Brand: MEDLINE

## (undated) DEVICE — SUT SLK 2-0SH 30IN BLK --

## (undated) DEVICE — DRAPE,UTILTY,TAPE,15X26, 4EA/PK: Brand: MEDLINE